# Patient Record
Sex: MALE | Race: WHITE
[De-identification: names, ages, dates, MRNs, and addresses within clinical notes are randomized per-mention and may not be internally consistent; named-entity substitution may affect disease eponyms.]

---

## 2017-11-08 NOTE — ED PHYSICIAN CHART
ED Chief Complaint/HPI





- Patient Information


Date Seen:: 11/08/17


Time Seen:: 20:30


Chief Complaint:: Bilateral lower extremities ulcers


History of Present Illness:: 


73 yo male was brought by caretaker from [place] to ER for evaluation of 

bilateral lower extremities ulcers for more than a few months. These ulcers are 

healing with eschar except re-opening of an ulcer in the RLE. The patient was 

oriented to his name and place, not time.


Allergies:: 


 Allergies











Allergy/AdvReac Type Severity Reaction Status Date / Time


 


niacin Allergy   Verified 11/07/17 18:59











Vitals:: 


 Vital Signs - 8 hr











  11/07/17





  18:59


 


Temp 97.2 F


 


HR 54


 


RR 16


 


/76


 


O2 Sat % 96














ED Review of Systems





- Review of Systems


General/Constitutional: No fever, No chills


Skin: Skin lesions


Head: No headache


Eyes: No loss of vision


ENT: No nasal drainage


Neck: No neck pain, No swelling


Cardio Vascular: No chest pain


Pulmonary: No SOB


GI: No nausea, No vomiting


Musculoskeletal: No bone or joint pain


Psychiatric: Prior psych history, Anxiety


Neurological: No syncope





ED Past Medical History





- Past Medical History


Past Medical History: HTN, DVT/PE, PUD/GERD, Other (BPH)


Psychiatricy History: Schizophrenia, Dementia, Other (Anxiety)





Family Medical History





- Family Member


  ** Mother


History Unknown: Yes





ED Physical Exam





- Physical Examination


General/Constitutional: Awake, Alert


Head: Atraumatic


Eyes: PERRL, EOMI


Other Skin comments:: 


Multiple healed ulcers with eschar on BLE with a open ulcer on RLE


Neck: No JVD


Respiratory: Clear to Auscultation, No Wheeze/Rhonchi/Rales


Cardio Vascular: RRR, No murmur, gallop, rubs, NL S1 S2


Extremities: No edema


Other Extremities comments:: 


mild erythema of BLE surrounding the ulcers





ED Labs/Radiology/EKG Results





- Lab Results


Results: 


 Laboratory Tests











  11/07/17 11/07/17 11/07/17





  20:22 20:22 20:22


 


WBC  9.2  


 


RBC  4.10  


 


Hgb  13.0  


 


Hct  38.7 L  


 


MCV  94.5  


 


MCH  31.8 H  


 


MCHC Differential  33.7  


 


RDW  13.7  


 


Plt Count  201  


 


MPV  7.2  


 


Neutrophils %  67.2  


 


Lymphocytes %  23.1  


 


Monocytes %  6.5  


 


Eosinophils %  2.6  


 


Basophils %  0.6  


 


Sodium   133 L 


 


Potassium   4.2 


 


Chloride   100 


 


Carbon Dioxide   29.0 


 


Anion Gap   8.2 


 


BUN   24 


 


Creatinine   1.3 


 


Est GFR ( Amer)   TNP 


 


Est GFR (Non-Af Amer)   TNP 


 


BUN/Creatinine Ratio   18.5 


 


Glucose   88 


 


Hemoglobin A1c %    5.8


 


Calcium   9.7 


 


Total Bilirubin   0.8 


 


AST   23 


 


ALT   22 


 


Alkaline Phosphatase   52 


 


Total Protein   7.3 


 


Albumin   3.7 L 


 


Globulin   3.6 


 


Albumin/Globulin Ratio   1.0 














ED Assessment





- Assessment


General Assessment: 


BLE mild cellulitis, BLE ulcers healed, RLE open ulcer, hyponatremia


Critical Care Time: 30 min


Excludes all billable procedures: Yes


This condition life threatening/high prob of deterioration: No


Assessment/Comments:: 


CBC, CMP, HbA1c, CXR, EKG


  Admit to med surg for further evaluation and management





ED Septic Shock





- .


Is Septic Shock (SBP<90, OR Lactate>4 mmol\L) present?: No





- <6hrs of presentation:


Vital Signs: 


 Vital Signs - 8 hr











  11/07/17





  18:59


 


Temp 97.2 F


 


HR 54


 


RR 16


 


/76


 


O2 Sat % 96














ED Reassessment (Disposition)





- Reassessment


Reassessment Condition:: Unchanged





- Patient Disposition


Discharge/Transfer:: Acute Care w/in this hosp


Admitting Medical Physician:: Carmina Ferrell





ED Discharge Plan





- Patient Disposition


Admit/Discharge/Transfer: Acute Care w/in this hosp

## 2017-11-09 NOTE — HISTORY & PHYSICAL
ADMIT DATE:     11/08/2017



CHIEF COMPLAINT:  Bilateral lower extremity redness and RLE open ulcer.



HISTORY OF PRESENT ILLNESS:  The patient is a 72-year-old gentleman with history

of hypertension, BPH, acid reflux disease, possible Parkinson's, who was

transferred to the ER from his board and care apparently secondary to the above.

In the ER, pertinent findings included sodium of 133, BUN of 24 and physically,

bilateral lower extremity redness.  The patient is a poor historian, is not able

to provide any significant history.  He has been admitted to the

medical/surgical floor for IV hydration, wound care and IV abxs. Per caretaker 
at

assisted facility, pt was recently admitted there with previous hx of ble open 
wounds, but

at the time of admission they were well healed, however, yesterday when they 
evaled him,

the RLE had re-opened again.



PAST MEDICAL HISTORY:  Per records as noted above.



PAST SURGICAL HISTORY:  Unknown.



FAMILY HISTORY:  Likely noncontributory to his admission.



SOCIAL HISTORY:  Unknown, but he currently lives at a [facility] and [facility].



ALLERGIES:  Allergic to niacin.



OUTPATIENT MEDICATIONS:  Tylenol p.r.n., vitamin D 1000 units q. daily,

dexlansoprazole 60 mg a.c. and at bedtime, Depakote ER t.i.d., docusate sodium

100 mg q. day, escitalopram 10 mg q. day, Proscar at bedtime, milk of magnesia

30 mL p.r.n. for constipation, metoprolol 25 b.i.d., multivitamins q. day,

Zyprexa 1 mg at bedtime, omeprazole 20 mg q.a.c., oxybutynin 2 tabs q. daily,

ramipril 10 mg b.i.d., Xarelto once a day, solifenacin q. day, Flomax 0.4 at

bedtime.



REVIEW OF SYSTEMS:  A good review of systems was not able to be done given the

patient's condition.



PHYSICAL EXAMINATION:

VITAL SIGNS:  Temperature 96, pulse 68, /82, respirations 18, and satting

94% on room air.

GENERAL:  He is a well-developed and well-nourished gentleman, awake, he seems

to follow commands and attempts to answer questions, but currently is just

grumbling.  He appears to be in no distress.

HEAD AND NECK:  Normocephalic, atraumatic.  Pupils are reactive to light. 

Extraocular movements are intact.  Oropharynx is moist and clear.

CARDIAC:  Regular rate and rhythm with no murmurs.

LUNGS:  Decreased at the bases, but clear to auscultation bilaterally.

ABDOMEN:  Soft, supple, nontender, nondistended, normoactive bowel sounds.

EXTREMITIES:  In lower extremities, there are dermatitic changes on both legs up

to the mid shins.  There is mild erythema noted on both legs, but no open wounds

and no ulcers.  There is 1-2+ pedal pulses. RLE with very small round open wound

on the lateral aspect, which appears clean with no surrounding erythema. On the 

LLE, there is a small round ulcer at the same position as the RLE with eschar 
formation.

NEUROLOGIC:  Difficult to assess given the patient's condition.  He does have

tremors noted, both upper extremity tremor, appears to be nonfocal otherwise.



LABORATORY DATA:  On admission; his CBC was essentially within normal limits. 

Sodium 133, BUN 24, creatinine 1.3, and albumin 3.7.



DIAGNOSTICS:  None current.



ASSESSMENT:

1.  Mild bilateral lower extremity cellulitis.

2.  RLE open ulcer-f/u wound c/s, daily WC, silvadene cream

3.  LLE ulcer-appears to be healing with eschar formation.

4.  Hyponatremia-IVF, monitor.

5.  Mild renal insufficiency/dehydration-as above.

6.  Essential hypertension.

7.  Acid reflux disease.

8.  DVT prophylaxis-on Xarelto.

9.  Parkinson's dz?



PLAN:  The patient has been admitted to the medical floor for further management

and care.  He has been placed on IV abxs, IVF at 75 mL per hour. Daily WC has 
been

ordered, and a wound C/S has been collected. He will be kept on his other 
medications

as scheduled, and we will monitor his labs.  





DD: 11/09/2017 09:12

DT: 11/09/2017 10:04

JOB# [id number]  [id number]

[id number]

## 2018-11-21 ENCOUNTER — HOSPITAL ENCOUNTER (INPATIENT)
Dept: HOSPITAL 36 - ER | Age: 73
LOS: 7 days | Discharge: NURSING, REHAB OR LONG-TERM CARE | DRG: 391 | End: 2018-11-28
Attending: INTERNAL MEDICINE | Admitting: INTERNAL MEDICINE
Payer: MEDICARE

## 2018-11-21 VITALS — DIASTOLIC BLOOD PRESSURE: 76 MMHG | SYSTOLIC BLOOD PRESSURE: 139 MMHG

## 2018-11-21 DIAGNOSIS — F03.90: ICD-10-CM

## 2018-11-21 DIAGNOSIS — F32.9: ICD-10-CM

## 2018-11-21 DIAGNOSIS — Z86.718: ICD-10-CM

## 2018-11-21 DIAGNOSIS — B96.20: ICD-10-CM

## 2018-11-21 DIAGNOSIS — B95.8: ICD-10-CM

## 2018-11-21 DIAGNOSIS — N18.9: ICD-10-CM

## 2018-11-21 DIAGNOSIS — F79: ICD-10-CM

## 2018-11-21 DIAGNOSIS — R29.2: ICD-10-CM

## 2018-11-21 DIAGNOSIS — E78.5: ICD-10-CM

## 2018-11-21 DIAGNOSIS — N40.0: ICD-10-CM

## 2018-11-21 DIAGNOSIS — D72.829: ICD-10-CM

## 2018-11-21 DIAGNOSIS — F20.0: ICD-10-CM

## 2018-11-21 DIAGNOSIS — N31.9: ICD-10-CM

## 2018-11-21 DIAGNOSIS — E86.0: ICD-10-CM

## 2018-11-21 DIAGNOSIS — I12.9: ICD-10-CM

## 2018-11-21 DIAGNOSIS — G40.909: ICD-10-CM

## 2018-11-21 DIAGNOSIS — F29: ICD-10-CM

## 2018-11-21 DIAGNOSIS — N17.0: ICD-10-CM

## 2018-11-21 DIAGNOSIS — K52.9: Primary | ICD-10-CM

## 2018-11-21 DIAGNOSIS — I82.409: ICD-10-CM

## 2018-11-21 DIAGNOSIS — Z88.8: ICD-10-CM

## 2018-11-21 DIAGNOSIS — N13.30: ICD-10-CM

## 2018-11-21 DIAGNOSIS — N35.919: ICD-10-CM

## 2018-11-21 DIAGNOSIS — N39.0: ICD-10-CM

## 2018-11-21 DIAGNOSIS — L97.929: ICD-10-CM

## 2018-11-21 LAB
ALBUMIN SERPL-MCNC: 3.5 GM/DL (ref 4.2–5.5)
ALBUMIN/GLOB SERPL: 1 {RATIO} (ref 1–1.8)
ALP SERPL-CCNC: 25 U/L (ref 34–104)
ALT SERPL-CCNC: 14 U/L (ref 7–52)
AMYLASE SERPL-CCNC: 29 U/L (ref 29–103)
ANION GAP SERPL CALC-SCNC: 13.8 MMOL/L (ref 7–16)
APPEARANCE UR: (no result)
AST SERPL-CCNC: 25 U/L (ref 13–39)
BACTERIA #/AREA URNS HPF: (no result) /HPF
BASOPHILS # BLD AUTO: 0 TH/CUMM (ref 0–0.2)
BASOPHILS NFR BLD AUTO: 0.2 % (ref 0–2)
BILIRUB SERPL-MCNC: 0.3 MG/DL (ref 0.3–1)
BILIRUB UR-MCNC: NEGATIVE MG/DL
BUN SERPL-MCNC: 68 MG/DL (ref 7–25)
CALCIUM SERPL-MCNC: 9.5 MG/DL (ref 8.6–10.3)
CHLORIDE SERPL-SCNC: 106 MEQ/L (ref 98–107)
CHOLEST SERPL-MCNC: 160 MG/DL (ref ?–200)
CK SERPL-CCNC: 27 U/L (ref 30–223)
CO2 SERPL-SCNC: 25.1 MEQ/L (ref 21–31)
COLOR UR: YELLOW
CREAT SERPL-MCNC: 2.9 MG/DL (ref 0.7–1.3)
EOSINOPHIL # BLD AUTO: 0.2 TH/CMM (ref 0.1–0.4)
EOSINOPHIL NFR BLD AUTO: 1.7 % (ref 0–5)
EPI CELLS URNS QL MICRO: (no result) /LPF
ERYTHROCYTE [DISTWIDTH] IN BLOOD BY AUTOMATED COUNT: 12.4 % (ref 11.5–20)
GLOBULIN SER-MCNC: 3.7 GM/DL
GLUCOSE SERPL-MCNC: 134 MG/DL (ref 70–105)
GLUCOSE UR STRIP-MCNC: NEGATIVE MG/DL
HCT VFR BLD CALC: 35.6 % (ref 41–60)
HDLC SERPL-MCNC: 36 MG/DL (ref 23–92)
HGB BLD-MCNC: 12.1 GM/DL (ref 12–16)
INR PPP: 1.3 (ref 0.5–1.4)
KETONES UR STRIP-MCNC: NEGATIVE MG/DL
LEUKOCYTE ESTERASE UR-ACNC: (no result)
LIPASE SERPL-CCNC: 63 U/L (ref 11–82)
LYMPHOCYTE AB SER FC-ACNC: 1.7 TH/CMM (ref 1.5–3)
LYMPHOCYTES NFR BLD AUTO: 14.7 % (ref 20–50)
MCH RBC QN AUTO: 33.3 PG (ref 27–31)
MCHC RBC AUTO-ENTMCNC: 34 PG (ref 28–36)
MCV RBC AUTO: 98 FL (ref 80–99)
MICRO URNS: YES
MONOCYTES # BLD AUTO: 0.9 TH/CMM (ref 0.3–1)
MONOCYTES NFR BLD AUTO: 7.6 % (ref 2–10)
NEUTROPHILS # BLD: 8.6 TH/CMM (ref 1.8–8)
NEUTROPHILS NFR BLD AUTO: 75.8 % (ref 40–80)
NITRITE UR QL STRIP: POSITIVE
PH UR STRIP: 6.5 [PH] (ref 4.6–8)
PLATELET # BLD: 273 TH/CMM (ref 150–400)
PMV BLD AUTO: 7.8 FL
POTASSIUM SERPL-SCNC: 4.9 MEQ/L (ref 3.5–5.1)
PROT UR STRIP-MCNC: 100 MG/DL
PROTHROMBIN TIME: 13.3 SECONDS (ref 9.5–11.5)
RBC # BLD AUTO: 3.63 MIL/CMM (ref 3.8–5.8)
RBC # UR STRIP: (no result) /UL
RBC #/AREA URNS HPF: (no result) /HPF (ref 0–5)
SODIUM SERPL-SCNC: 140 MEQ/L (ref 136–145)
SP GR UR STRIP: 1.02 (ref 1–1.03)
TRIGL SERPL-MCNC: 201 MG/DL (ref ?–150)
URINALYSIS COMPLETE PNL UR: (no result)
UROBILINOGEN UR STRIP-ACNC: 0.2 E.U./DL (ref 0.2–1)
WBC # BLD AUTO: 11.4 TH/CMM (ref 4.8–10.8)
WBC #/AREA URNS HPF: (no result) /HPF (ref 0–5)

## 2018-11-21 PROCEDURE — X6258: HCPCS

## 2018-11-21 PROCEDURE — Z7610: HCPCS

## 2018-11-21 PROCEDURE — X5958: HCPCS

## 2018-11-21 PROCEDURE — X3401: HCPCS

## 2018-11-21 NOTE — ED PHYSICIAN CHART
ED Chief Complaint/HPI





- Patient Information


Date Seen:: 11/21/18


Time Seen:: 13:25


Chief Complaint:: Vomiting


History of Present Illness:: 





onset x 2 days of N/V/D x 6; no report of trauma, LOC, ALOC, AMS, H/As, S/T, 

neck pain, cough, C/P, SOB, Abd. Pain, A/C, fever, chills, or urinary s/s


Allergies:: 


 Allergies











Allergy/AdvReac Type Severity Reaction Status Date / Time


 


niacin Allergy   Verified 11/07/17 18:59











Vitals:: 


 Vital Signs - 8 hr











  11/21/18





  13:25


 


Temp 96.6 F


 


HR 63


 


RR 18


 


/60











Historian:: Patient, EMS


Review:: Nurse's Note Reviewed, Old Chart Reviewed, EMS run form Reviewed





ED Review of Systems





- Review of Systems


General/Constitutional: No fever, No chills, No weight loss, No weakness, No 

diaphoresis, No edema, No loss of appetite


Skin: No skin lesions, No rash, No bruising


Head: No headache, No light-headedness


Eyes: No loss of vision, No pain, No diplopia


ENT: No earache, No nasal drainage, No sore throat, No tinnitus


Neck: No neck pain, No swelling, No thyromegaly, No stiffness, No mass noted


Cardio Vascular: No chest pain, No palpitations, No PND, No orthopnea, No edema


Pulmonary: No SOB, No cough, No sputum, No wheezing


GI: Nausea, Vomiting, Diarrhea, Pain, No melena, No hematochezia, No 

constipation, No hematemesis


G/U: No dysuria, No frequency, No hematuria, No nacturia


Musculoskeletal: No bone or joint pain, No back pain, No muscle pain


Endocrine: No polyuria, No polydipsia


Psychiatric: Prior psych history, Depression, Anxiety, No suicidal ideation, No 

homicidal ideation, Auditory hallucination, No visual hallucination


Hematopoietic: No bruising, No lymphadenopathy


Allergic/Immuno: No urticaria, No angioedema


Neurological: No syncope, No focal symptoms, No weakness, No paresthesia, No 

headache, No seizure, No dizziness, Confusion, No vertigo





ED Past Medical History





- Past Medical History


Obtainable: Yes


Past Medical History: HTN, Dementia


Family History: HTN


Social History: Non Smoker, No Alcohol, No Drug Use, Single, Care Facility


Surgical History: None


Psychiatricy History: Depression, Schizophrenia, Dementia


Medication: Reviewed





Family Medical History





- Family Member


  ** Mother


History Unknown: Yes





ED Physical Exam





- Physical Examination


General/Constitutional: Awake, Well-developed, well-nourished, Alert, No 

distress, GCS 15, Non-toxic appearing, Ambulatory


Head: Atraumatic


Eyes: Lids, conjuctiva normal, PERRL, EOMI


Skin: Nl inspection, No rash, No skin lesions, No ecchymosis, Well hydrated, No 

lymphadenopathy


ENMT: External ears, nose nl, TM canals nl, Nasal exam nl, Lips, teeth, gums nl

, Oropharynx nl, Tonsils nl


Neck: Nontender, Full ROM w/o pain, No JVD, No nuchal rigidity, No bruit, No 

mass, No stridor


Respiratory: Nl effort/Exclusion, Clear to Auscultation, No Wheeze/Rhonchi/Rales


Cardio Vascular: RRR, No murmur, gallop, rubs, NL S1 S2, Carotid/Femoral/Distal 

pulses equal bilaterally


GI: No tenderness/rebounding/guarding, No organomegaly, No hernia, Normal BS's, 

Nondistended, No mass/bruits, No McBurney tenderness, Rectum exam nl


Other GI comments:: 





no pulsatile masses


: No CVA tenderness


Extremities: No tenderness or effusion, Full ROM, normal strength in all 

extremities, No edema, Normal digits & nails


Neuro/Psych: Alert/oriented, DTR's symmetric, Normal sensory exam, Normal motor 

strength, Judgement/insight normal, Mood normal, Normal gait, No focal deficits


Misc: Normal back, No paraspinal tenderness





ED Labs/Radiology/EKG Results





- Lab Results


Comments:: 





Reviewed





- Radiology Results


Comments:: 





NAD





- EKG Interpretations


EKG Time:: 14:02


Rate & Rhythm: 62; NSR


Comments:: 





RBBB; non-specific st-t changes





ED Septic Shock





- .


Is Septic Shock (SBP<90, OR Lactate>4 mmol\L) present?: No





- <6hrs of presentation:


Vital Signs: 


 Vital Signs - 8 hr











  11/21/18





  13:25


 


Temp 96.6 F


 


HR 63


 


RR 18


 


/60














ED Reassessment (Disposition)





- Reassessment


Reassessment Condition:: Improved





- Diagnosis


Diagnosis:: 





Dx: AGE; N/V/D; Gastroenteritis; Leukocytosis; Dehydration; Renal Insuffiency; 

Pre-Renal Azotemia; Hematuria; UTI





- Aftercare/Follow up Instructions


Aftercare/Follow-Up Instructions:: Counseled pt regarding lab results/diagnosis 

& need follow up, Counseled pt & family regarding lab results/diagnosis & need 

follow up





- Patient Disposition


Discharge/Transfer:: Acute Care w/in this hosp


Accepting Physician:: Dr. Ferrell


Time Called:: 1515


Time Responded:: 15:15


Admitted to:: Telemetry


Spoke to:: Dr. Ferrell


Admitting Medical Physician:: Dr. Ferrell


Condition at Disposition:: Stable, Improved

## 2018-11-22 LAB
ALBUMIN SERPL-MCNC: 3.5 GM/DL (ref 4.2–5.5)
ALBUMIN/GLOB SERPL: 1 {RATIO} (ref 1–1.8)
ALP SERPL-CCNC: 31 U/L (ref 34–104)
ALT SERPL-CCNC: 14 U/L (ref 7–52)
ANION GAP SERPL CALC-SCNC: 10.3 MMOL/L (ref 7–16)
AST SERPL-CCNC: 28 U/L (ref 13–39)
BASOPHILS # BLD AUTO: 0 TH/CUMM (ref 0–0.2)
BASOPHILS NFR BLD AUTO: 0.5 % (ref 0–2)
BILIRUB SERPL-MCNC: 0.4 MG/DL (ref 0.3–1)
BUN SERPL-MCNC: 60 MG/DL (ref 7–25)
CALCIUM SERPL-MCNC: 9.7 MG/DL (ref 8.6–10.3)
CHLORIDE SERPL-SCNC: 109 MEQ/L (ref 98–107)
CO2 SERPL-SCNC: 27.6 MEQ/L (ref 21–31)
CREAT SERPL-MCNC: 2.5 MG/DL (ref 0.7–1.3)
EOSINOPHIL # BLD AUTO: 0.2 TH/CMM (ref 0.1–0.4)
EOSINOPHIL # BLD MANUAL: (no result) 10*3/UL
EOSINOPHIL BLD QL WRIGHT STN: (no result)
EOSINOPHIL NFR BLD AUTO: 2.7 % (ref 0–5)
ERYTHROCYTE [DISTWIDTH] IN BLOOD BY AUTOMATED COUNT: 12.6 % (ref 11.5–20)
GLOBULIN SER-MCNC: 3.6 GM/DL
GLUCOSE SERPL-MCNC: 84 MG/DL (ref 70–105)
HCT VFR BLD CALC: 35.4 % (ref 41–60)
HGB BLD-MCNC: 12.1 GM/DL (ref 12–16)
LYMPHOCYTE AB SER FC-ACNC: 1.4 TH/CMM (ref 1.5–3)
LYMPHOCYTES NFR BLD AUTO: 15.8 % (ref 20–50)
MAGNESIUM SERPL-MCNC: 2.2 MG/DL (ref 1.9–2.7)
MCH RBC QN AUTO: 33.6 PG (ref 27–31)
MCHC RBC AUTO-ENTMCNC: 34.2 PG (ref 28–36)
MCV RBC AUTO: 98 FL (ref 80–99)
MONOCYTES # BLD AUTO: 0.7 TH/CMM (ref 0.3–1)
MONOCYTES NFR BLD AUTO: 8.4 % (ref 2–10)
NEUTROPHILS # BLD: 6.4 TH/CMM (ref 1.8–8)
NEUTROPHILS NFR BLD AUTO: 72.6 % (ref 40–80)
PLATELET # BLD: 267 TH/CMM (ref 150–400)
PMV BLD AUTO: 7.4 FL
POTASSIUM SERPL-SCNC: 4.9 MEQ/L (ref 3.5–5.1)
RBC # BLD AUTO: 3.61 MIL/CMM (ref 3.8–5.8)
SODIUM SERPL-SCNC: 142 MEQ/L (ref 136–145)
WBC # BLD AUTO: 8.7 TH/CMM (ref 4.8–10.8)

## 2018-11-22 RX ADMIN — OYSTER SHELL CALCIUM WITH VITAMIN D SCH TAB: 500; 200 TABLET, FILM COATED ORAL at 16:20

## 2018-11-23 LAB
ANION GAP SERPL CALC-SCNC: 13.5 MMOL/L (ref 7–16)
BASOPHILS # BLD AUTO: 0 TH/CUMM (ref 0–0.2)
BASOPHILS NFR BLD AUTO: 0.3 % (ref 0–2)
BUN SERPL-MCNC: 39 MG/DL (ref 7–25)
CALCIUM SERPL-MCNC: 8.9 MG/DL (ref 8.6–10.3)
CHLORIDE SERPL-SCNC: 107 MEQ/L (ref 98–107)
CO2 SERPL-SCNC: 22.2 MEQ/L (ref 21–31)
CREAT SERPL-MCNC: 1.9 MG/DL (ref 0.7–1.3)
EOSINOPHIL # BLD AUTO: 0.3 TH/CMM (ref 0.1–0.4)
EOSINOPHIL NFR BLD AUTO: 2 % (ref 0–5)
ERYTHROCYTE [DISTWIDTH] IN BLOOD BY AUTOMATED COUNT: 12.1 % (ref 11.5–20)
GLUCOSE SERPL-MCNC: 85 MG/DL (ref 70–105)
HCT VFR BLD CALC: 31.7 % (ref 41–60)
HGB BLD-MCNC: 11.1 GM/DL (ref 12–16)
LYMPHOCYTE AB SER FC-ACNC: 1.8 TH/CMM (ref 1.5–3)
LYMPHOCYTES NFR BLD AUTO: 14.4 % (ref 20–50)
MAGNESIUM SERPL-MCNC: 1.6 MG/DL (ref 1.9–2.7)
MCH RBC QN AUTO: 33.5 PG (ref 27–31)
MCHC RBC AUTO-ENTMCNC: 34.8 PG (ref 28–36)
MCV RBC AUTO: 96.1 FL (ref 80–99)
MONOCYTES # BLD AUTO: 1 TH/CMM (ref 0.3–1)
MONOCYTES NFR BLD AUTO: 8.2 % (ref 2–10)
NEUTROPHILS # BLD: 9.5 TH/CMM (ref 1.8–8)
NEUTROPHILS NFR BLD AUTO: 75.1 % (ref 40–80)
PHOSPHATE SERPL-MCNC: 2.6 MG/DL (ref 2.5–5)
PLATELET # BLD: 214 TH/CMM (ref 150–400)
PMV BLD AUTO: 6.9 FL
POTASSIUM SERPL-SCNC: 4.7 MEQ/L (ref 3.5–5.1)
RBC # BLD AUTO: 3.3 MIL/CMM (ref 3.8–5.8)
SODIUM SERPL-SCNC: 138 MEQ/L (ref 136–145)
WBC # BLD AUTO: 12.6 TH/CMM (ref 4.8–10.8)

## 2018-11-23 RX ADMIN — CIPROFLOXACIN SCH MLS/HR: 2 INJECTION, SOLUTION INTRAVENOUS at 10:21

## 2018-11-23 RX ADMIN — PANTOPRAZOLE SODIUM SCH MG: 40 TABLET, DELAYED RELEASE ORAL at 10:17

## 2018-11-23 RX ADMIN — OYSTER SHELL CALCIUM WITH VITAMIN D SCH TAB: 500; 200 TABLET, FILM COATED ORAL at 10:17

## 2018-11-23 RX ADMIN — OYSTER SHELL CALCIUM WITH VITAMIN D SCH TAB: 500; 200 TABLET, FILM COATED ORAL at 17:39

## 2018-11-23 NOTE — GENERAL PROGRESS NOTE
Subjective





- Review of Systems


Service Date: 18


Subjective: 





alert, comfortable; no further N/V, diarrhea





Objective





- Results


Result Diagrams: 


 18 04:25





 18 04:25


Recent Labs: 


 Laboratory Last Values











WBC  12.6 Th/cmm (4.8-10.8)  H  18  04:25    


 


RBC  3.30 Mil/cmm (3.80-5.80)  L  18  04:25    


 


Hgb  11.1 gm/dL (12-16)  L  18  04:25    


 


Hct  31.7 % (41.0-60)  L  18  04:25    


 


MCV  96.1 fl (80-99)   18  04:25    


 


MCH  33.5 pg (27.0-31.0)  H  18  04:25    


 


MCHC Differential  34.8 pg (28.0-36.0)   18  04:25    


 


RDW  12.1 % (11.5-20.0)   18  04:25    


 


Plt Count  214 Th/cmm (150-400)   18  04:25    


 


MPV  6.9 fl  18  04:25    


 


Neutrophils %  75.1 % (40.0-80.0)   18  04:25    


 


Lymphocytes %  14.4 % (20.0-50.0)  L  18  04:25    


 


Monocytes %  8.2 % (2.0-10.0)   18  04:25    


 


Eosinophils %  2.0 % (0.0-5.0)   18  04:25    


 


Basophils %  0.3 % (0.0-2.0)   18  04:25    


 


Eos Smear Source  URINE   18  11:55    


 


Eos Smear Total Cells  NONE SEEN  (NONE SEEN)   18  11:55    


 


PT  13.3 SECONDS (9.5-11.5)  H  18  13:50    


 


INR  1.30  (0.5-1.4)   18  13:50    


 


Sodium  138 mEq/L (136-145)   18  04:25    


 


Potassium  4.7 mEq/L (3.5-5.1)   18  04:25    


 


Chloride  107 mEq/L ()   18  04:25    


 


Carbon Dioxide  22.2 mEq/L (21.0-31.0)   18  04:25    


 


Anion Gap  13.5  (7.0-16.0)   18  04:25    


 


BUN  39 mg/dL (7-25)  H  18  04:25    


 


Creatinine  1.9 mg/dL (0.7-1.3)  H  18  04:25    


 


Est GFR ( Amer)  TNP   18  04:25    


 


Est GFR (Non-Af Amer)  TNP   18  04:25    


 


BUN/Creatinine Ratio  20.5   18  04:25    


 


Glucose  85 mg/dL ()   18  04:25    


 


Calcium  8.9 mg/dL (8.6-10.3)   18  04:25    


 


Phosphorus  2.6 mg/dL (2.5-5.0)   18  04:25    


 


Magnesium  1.6 mg/dL (1.9-2.7)  L  18  04:25    


 


Total Bilirubin  0.4 mg/dL (0.3-1.0)   18  06:05    


 


AST  28 U/L (13-39)   18  06:05    


 


ALT  14 U/L (7-52)   18  06:05    


 


Alkaline Phosphatase  31 U/L ()  L  18  06:05    


 


Creatine Kinase  27 U/L ()  L  18  13:50    


 


Troponin I  0.02 ng/mL (0.01-0.05)   18  13:50    


 


B-Natriuretic Peptide  51.8 pg/mL (5.0-100.0)   18  13:50    


 


Total Protein  7.1 gm/dL (6.0-8.3)   18  06:05    


 


Albumin  3.5 gm/dL (4.2-5.5)  L  18  06:05    


 


Globulin  3.6 gm/dL  18  06:05    


 


Albumin/Globulin Ratio  1.0  (1.0-1.8)   18  06:05    


 


Triglycerides  201 mg/dL (<150)  H  18  13:50    


 


Cholesterol  160 mg/dL (<200)   18  13:50    


 


LDL Cholesterol Direct  110 mg/dL ()   18  13:50    


 


HDL Cholesterol  36 mg/dL (23-92)   18  13:50    


 


Amylase  29 U/L ()   18  13:50    


 


Lipase  63 U/L (11-82)   18  13:50    


 


TSH  5.80 uIU/ml (0.34-5.60)  H  18  06:05    


 


Urine Source  CLEAN C   18  15:15    


 


Urine Color  YELLOW   18  15:15    


 


Urine Clarity  CLOUDY  (CLEAR)   18  15:15    


 


Urine pH  6.5  (4.6 - 8.0)   18  15:15    


 


Ur Specific Gravity  1.020  (1.005-1.030)   18  15:15    


 


Urine Protein  100 mg/dL (NEGATIVE)  H  18  15:15    


 


Urine Glucose (UA)  NEGATIVE mg/dL (NEGATIVE)   18  15:15    


 


Urine Ketones  NEGATIVE mg/dL (NEGATIVE)   18  15:15    


 


Urine Blood  LARGE  (NEGATIVE)  H  18  15:15    


 


Urine Nitrate  POSITIVE  (NEGATIVE)  H  18  15:15    


 


Urine Bilirubin  NEGATIVE  (NEGATIVE)   18  15:15    


 


Urine Urobilinogen  0.2 E.U./dL (0.2 - 1.0)   18  15:15    


 


Ur Leukocyte Esterase  LARGE  (NEGATIVE)  H  18  15:15    


 


Urine RBC  5-10 /hpf (0-5)  H  18  15:15    


 


Urine WBC   /hpf (0-5)  H  18  15:15    


 


Ur Epithelial Cells  FEW /lpf (FEW)   18  15:15    


 


Urine Bacteria  MANY /hpf (NONE SEEN)  H  18  15:15    


 


Ur Random Sodium  119 mmol/L  18  11:55    


 


Urine Creatinine  74.0 mg/dl (39.0-259.0)   18  11:55    


 


Microalb/Creat Ratio  195.2 mg/g creat (0.0-30.0)  H  18  11:55    














- Physical Exam


Vitals and I&O: 


 Vital Signs











Temp  98.1 F   18 16:45


 


Pulse  76   18 16:45


 


Resp  18   18 16:45


 


BP  118/70   18 16:45


 


Pulse Ox  98   18 16:45








 Intake & Output











 18





 18:59 06:59 18:59


 


Intake Total 550 1050 


 


Balance 550 1050 


 


Weight (lbs) 63.957 kg 63.957 kg 


 


Intake:   


 


  Intake, IV Amount  1000 


 


    Sodium Chloride 0.9% 1,  1000 





    000 ml @ 100 mls/hr IV .   





    Q10H [facility] Rx#:[id number]   


 


  Oral 550 50 


 


Other:   


 


  # Voids 3 2 


 


  # Bowel Movements 1 0 


 


  Stool Characteristics   Soft





   Formed


 


  Weight Source Bedscale Bedscale 











Active Medications: 


Current Medications





Acetaminophen (Tylenol)  650 mg PO Q6H PRN


   PRN Reason: fever/pain


   Stop: 19 13:42


Calcium/Vitamin D (Oscal W/Vitamin D)  1 tab PO BID [facility]


   Stop: 19 16:59


   Last Admin: 18 10:17 Dose:  1 tab


Cholecalciferol (Vitamin D3)  1,000 iu PO DAILY [facility]


   Stop: 19 13:44


   Last Admin: 18 10:17 Dose:  1,000 iu


Divalproex Sodium (Depakote Er)  500 mg PO TID [facility]; Protocol


   Stop: 19 13:59


   Last Admin: 18 14:21 Dose:  500 mg


Docusate Sodium (Colace)  100 mg PO DAILY [facility]


   Stop: 19 13:44


   Last Admin: 18 10:17 Dose:  100 mg


Escitalopram Oxalate (Lexapro)  10 mg PO DAILY [facility]; Protocol


   Stop: 19 13:44


   Last Admin: 18 10:16 Dose:  10 mg


Finasteride (Proscar)  5 mg PO HS [facility]; Protocol


   Stop: 19 20:59


   Last Admin: 18 21:05 Dose:  5 mg


Sodium Chloride (Nacl 0.9%)  1,000 mls @ 100 mls/hr IV .Q10H [facility]


   Stop: 19 17:44


   Last Admin: 18 03:31 Dose:  100 mls/hr


Ceftriaxone Sodium 1 gm/ (Sodium Chloride)  50 mls @ 100 mls/hr IV Q24HR [facility]


   Stop: 19 16:59


   Last Admin: 18 16:19 Dose:  100 mls/hr


Ciprofloxacin (Cipro 200mg Premix Pb)  200 mg in 100 mls @ 100 mls/hr IV Q24HR 

[facility]


   Stop: 19 09:44


   Last Admin: 18 10:21 Dose:  100 mls/hr


Magnesium Hydroxide (Milk Of Magnesia)  30 ml PO DAILY PRN


   PRN Reason: Constipation


   Stop: 19 13:45


Metoprolol Tartrate (Lopressor)  25 mg PO BID [facility]


   Stop: 19 16:59


   Last Admin: 18 10:16 Dose:  25 mg


Olanzapine (Zyprexa)  5 mg PO [facility]; Protocol


   Stop: 19 20:59


   Last Admin: 18 21:05 Dose:  5 mg


Oxybutynin Chloride (Ditropan)  10 mg PO DAILY [facility]


   Stop: 19 08:59


   Last Admin: 18 10:16 Dose:  10 mg


Pantoprazole Sodium (Protonix)  40 mg PO DAILY [facility]


   Stop: 19 08:59


   Last Admin: 18 10:17 Dose:  40 mg


Rivaroxaban (Xarelto)  10 mg PO DAILY [facility]


   Stop: 19 08:59


   Last Admin: 18 10:17 Dose:  10 mg


Tamsulosin HCl (Flomax)  0.4 mg PO [facility]


   Stop: 19 20:59


   Last Admin: 18 21:05 Dose:  0.4 mg








General: Alert, No acute distress


HEENT: Atraumatic, Mucous membr. moist/pink


Neck: Supple, +2 carotid pulse wo bruit


Cardiovascular: Regular rate, Normal S1, Normal S2


Lungs: Clear to auscultation


Abdomen: Bowel sounds, Soft


Extremities: no Edema


Neurological: Sensation intact


Skin: no Significant lesion


Psych/Mental Status: Mood NL





- Procedures


Procedures: 


 Procedures











Procedure Code Date


 


C & S-INTEGUMENT 91.63 95


 


CULTURE OTHR SPECIMN AEROBIC 30676 95


 


DX ULTRASOUND-VASCULAR 88.77 95


 


ELECTROCARDIOGRAPH MONIT 89.54 95


 


INJECT ANTIBIOTIC 99.21 10/09/95


 


INJECT ANTICOAGULANT 99.19 95


 


MICROBE SUSCEPTIBLE DISK 02576 95


 


OTHER C.A.T. SCAN 88.38 95


 


URINARY SYSTEM X-RAY NEC 87.79 95


 


WHIRLPOOL THERAPY 61144 95


 


WHIRLPOOL TREATMENT 93.32 95














Assessment/Plan





- Assessment


Assessment: 





DEVIN


Acute gastroenteritis


Dehydration


Severe B/L hydronephrosis


Severe ID


Paranoid schizo


Ess Htn


BPH


Depression


LE DVT


Epilepsy


 








- Plan


Plan: 


Lab - Result Diagrams





 18 04:25 





 18 04:25 





Current Medications





Acetaminophen (Tylenol)  650 mg PO Q6H PRN


   PRN Reason: fever/pain


   Stop: 19 13:42


Calcium/Vitamin D (Oscal W/Vitamin D)  1 tab PO BID [facility]


   Stop: 19 16:59


   Last Admin: 18 10:17 Dose:  1 tab


Cholecalciferol (Vitamin D3)  1,000 iu PO DAILY [facility]


   Stop: 19 13:44


   Last Admin: 18 10:17 Dose:  1,000 iu


Divalproex Sodium (Depakote Er)  500 mg PO TID [facility]; Protocol


   Stop: 19 13:59


   Last Admin: 18 14:21 Dose:  500 mg


Docusate Sodium (Colace)  100 mg PO DAILY [facility]


   Stop: 19 13:44


   Last Admin: 18 10:17 Dose:  100 mg


Escitalopram Oxalate (Lexapro)  10 mg PO DAILY [facility]; Protocol


   Stop: 19 13:44


   Last Admin: 18 10:16 Dose:  10 mg


Finasteride (Proscar)  5 mg PO HS [facility]; Protocol


   Stop: 19 20:59


   Last Admin: 18 21:05 Dose:  5 mg


Sodium Chloride (Nacl 0.9%)  1,000 mls @ 100 mls/hr IV .Q10H [facility]


   Stop: 19 17:44


   Last Admin: 18 03:31 Dose:  100 mls/hr


Ceftriaxone Sodium 1 gm/ (Sodium Chloride)  50 mls @ 100 mls/hr IV Q24HR [facility]


   Stop: 19 16:59


   Last Admin: 18 16:19 Dose:  100 mls/hr


Ciprofloxacin (Cipro 200mg Premix Pb)  200 mg in 100 mls @ 100 mls/hr IV Q24HR 

[facility]


   Stop: 19 09:44


   Last Admin: 18 10:21 Dose:  100 mls/hr


Magnesium Hydroxide (Milk Of Magnesia)  30 ml PO DAILY PRN


   PRN Reason: Constipation


   Stop: 19 13:45


Metoprolol Tartrate (Lopressor)  25 mg PO BID [facility]


   Stop: 19 16:59


   Last Admin: 18 10:16 Dose:  25 mg


Olanzapine (Zyprexa)  5 mg PO HS [facility]; Protocol


   Stop: 19 20:59


   Last Admin: 18 21:05 Dose:  5 mg


Oxybutynin Chloride (Ditropan)  10 mg PO DAILY [facility]


   Stop: 19 08:59


   Last Admin: 18 10:16 Dose:  10 mg


Pantoprazole Sodium (Protonix)  40 mg PO DAILY [facility]


   Stop: 19 08:59


   Last Admin: 18 10:17 Dose:  40 mg


Rivaroxaban (Xarelto)  10 mg PO DAILY [facility]


   Stop: 19 08:59


   Last Admin: 18 10:17 Dose:  10 mg


Tamsulosin HCl (Flomax)  0.4 mg PO [facility]


   Stop: 19 20:59


   Last Admin: 18 21:05 Dose:  0.4 mg





Lab - Result Diagrams





 18 04:25 





 18 04:25 





Kidney fnc gradually improving w/ BUN/CR of 39/1.9


no further N/V/D


Renal US revealed severe B/L hydroneprosis but nondistended bladder


possible urethral stricture due to scar, fibrosis, mass


continue IVF, encourage po intake


Urology consult


f/u electrolytes, cbc








Nutritional Asmnt/Malnutr-PDOC





- Dietary Evaluation


Malnutrition Findings (Please click <Entered> for more info): 








Nutritional Asmnt/Malnutrition                             Start:  18 11:

06


Text:                                                      Status: Complete    

  


Freq:                                                                          

  


Protocol:                                                                      

  


 Document     18 11:07  ALECIACHERELLE  (Rec: 18 11:35  AICHACORINNA [id number])


 Nutritional Asmnt/Malnutrition


     Patient General Information


      Nutritional Screening                      High Risk


                                                 Consult


      Diagnosis                                  acute renal insufficient,


                                                 urosepsis


      Pertinent Medical Hx/Surgical Hx           HTN, dementia, depression,


                                                 schizophrenia


      Subjective Information                     Consult receved for wound. Pt


                                                 seen lying in bed at time of


                                                 visit, awake, non-verbal. Per


                                                 CNA, pt only consumed liquid


                                                 from breakfast tray today.


                                                 Observed pt was able to eat


                                                 chocolate chip cookie, pt did


                                                 not want the soup.


      Current Diet Order/ Nutrition Support      low sodium


      Pertinent Medications                      nacl 0.9%


      Pertinent Labs                              cl 109, BUN 60, Cr 2.5,


                                                 Glucose 84


                                                  BUN 68, Cr 2.9, glucose


                                                 134


     Nutritional Hx/Data


      Height                                     1.68 m


      Height (Calculated Centimeters)            167.6


      Current Weight (lbs)                       63.957 kg


      Weight (Calculated Kilograms)              64.0


      Weight (Calculated Grams)                  77619.5


      Ideal Body Weight                          142


      Body Mass Index (BMI)                      22.7


     GI Symptoms


      GI Symptoms                                None


      Difficult in:                              None


      Skin Integrity/Comment:                    reddened to left arm, left


                                                 buttocks, LT back big toe


                                                 open wound to left toe, left


                                                 lower leg


                                                 scar to left ankle


                                                 refugio score 11


      Current %PO                                Good (%)


     Estimated Nutritional Goals


      BEE in Kcals:                              Using Current wt


      Calories/Kcals/Kg                          25-30


      Kcals Calculated                           [id number]


      Protein:                                   Using Current wt


      Protein g/k.7-0.8 monitor renal labs


      Protein Calculated                         45-51


      Fluid: ml                                  per MD


     Nutritional Problem


      2. Problem


       Problem                                   increased nutrition needs


       Etiology                                  increased metabolic demand for


                                                 wound healing


       Signs/Symptoms:                           open wound


      1. Problem


       Problem                                   altered nutrition related labs


       Etiology                                  renal dysfunction


       Signs/Symptoms:                           BUN 60, Cr 2.5


     Malnutrition Alert


      Is there a minimum of two criteria         No


       selected?                                 


       Query Text:Check all the applicable       


       criteria. A minimum of two criteria are   


       recommended for diagnosis of either       


       severe or non-severe malnutrition.        


     Malnutrition Related to Morbid Obesity


      Malnutrition related to morbid obesity     No


     Intervention/Recommendation


      Comments                                   1. Continue with low sodium


                                                 diet as ordered. Consider


                                                 adding supplements if PO


                                                 intake not meeting nutritional


                                                 needs. Nurses to assist pt


                                                 with all meals.


                                                 2. Add Manuel BID fot wound


                                                 healing.


                                                 3. Monitor renal labs.


                                                 Consider to limit protein


                                                 intake if BUN/Cr continue


                                                 elevated when PO intake >75%.


                                                 4. Monitor PO intake, wt, labs


                                                 and skin integrity


                                                 5. F/U as high risk in 2-3


                                                 days, -, PO check 


     Expected Outcomes/Goals


      Expected Outcomes/Goals                    1. PO intake to meet at least


                                                 75% of nutritional needs.


                                                 2. Wt stability, skin to


                                                 remain intact, labs to


                                                 approach WNL.

## 2018-11-23 NOTE — HISTORY & PHYSICAL
ADMIT DATE:     11/22/2018



CHIEF COMPLAINT:  Nausea, vomiting and weakness.



HISTORY OF PRESENT ILLNESS:  The patient is a 73-year-old gentleman with a

history of depression, essential hypertension, BPH, history of lower extremity

DVT, psychosis, who presented from [facility] with 6 episodes 

of nausea and vomiting.  He is a poor historian overall, but per ER staff there 
was no mention

of abdominal pain, fever, chills, cough or phlegm production.  Pertinent

findings on admission include a white count of 11.4, BUN and creatinine of

68/2.9 and UA consistent with UTI.  The patient has been admitted to the

telemetry miner for further management and care.



PAST MEDICAL HISTORY:  As noted above.  Unspecified psych disorder.



PAST SURGICAL HISTORY:  None per records.



SOCIAL HISTORY:  No tobacco, ETOH or illicit drug usage.  He lives at [facility].



ALLERGIES:  ALLERGIC TO NIACIN.



OUTPATIENT MEDICATIONS:  Keflex 500 mg q.8, Altace 10 b.i.d., acetaminophen 650

q.6 hour p.r.n. for pain, calcium with vitamin D3 b.i.d., vitamin D3 1000

international units every day, Dexilant 60 q.a.c. and at bedtime, Depakote 500

mg t.i.d., Colace 100 mg daily, Lexapro 10 mg every daily, Proscar 5 mg at

bedtime, milk of magnesia 30 mL every daily p.r.n., metoprolol 25 b.i.d.,

multivitamins and minerals daily, olanzapine 5 mg at bedtime, omeprazole 20

q.a.c., oxybutynin 30 mg daily, Xarelto 20 mg every day, VESIcare 5 mg daily,

Flomax 0.4 at bedtime.



REVIEW OF SYSTEMS:

GENERAL:  There are no reports of fever, chills or recent weight loss.

CARDIOVASCULAR:  No chest pain or palpitations.

PULMONARY:  No cough or phlegm production.  No shortness of breath.

GASTROINTESTINAL:  Nausea, vomiting as noted above, but there is no diarrhea or

abdominal pain reported.

GENITOURINARY:  No dysuria or hematuria reported.

NEUROLOGIC:  No changes in vision, no headaches, no syncope.



PHYSICAL EXAMINATION:

VITAL SIGNS:  Temperature 97.0, pulse 71, respirations 18, /69 with sats

of 96% on room air.

GENERAL:  He is a well-developed, well-nourished male lying in bed with no acute

distress.  He is nontoxic appearing.

HEAD AND NECK:  Normocephalic, atraumatic.  Pupils reactive to light. 

Extraocular movements are intact.  Oropharynx is moist and clear.

NECK:  There is no JVD or LAD.

CARDIAC:  Regular rate and rhythm without any murmurs.

LUNGS:  Diminished at the bases, but otherwise clear to auscultation

bilaterally.

ABDOMEN:  Soft, supple.  Currently, nontender, nondistended, normoactive bowel

sounds.

EXTREMITIES:  Lower extremity, there is no pedal edema.



LABORATORY DATA:  On admission, white count 11.4, H and H 12/35, platelet count

273.  INR 1.30.  BUN 68, creatinine 2.9, glucose 134, otherwise all the

chemistries were within normal limits, alk phos 25.  Troponins are negative x 1

set.  BNP 51.  Albumin 3.5.  TSH 5.8.  UA shows positive for protein, large

blood, positive for nitrites, large leukocyte esterase with 5-10 rbcs and 

wbcs.



DIAGNOSTIC DATA:  EKG shows normal sinus rhythm at a rate of 62, no obvious

abnormalities noted.



IMPRESSION:

1.  Urinary tract infection.

2.  Gastroenteritis.

3.  Acute renal insufficiency.

4.  Leukocytosis.

5.  History of essential hypertension.

6.  History of depression/psych disorder.

7.  History of lower extremity deep venous thrombosis.

8.  Benign prostatic hypertrophy.



PLAN:  The patient has been admitted to a telemetry miner for further management

and care.  He has been placed on IV fluids, IV antibiotics and has been

pancultured.  He will also be kept on his other medications except for Altace

given his renal insufficiency.  We will ask for a renal consult as well as a

kidney ultrasound for management and care.





DD: 11/23/2018 09:40

DT: 11/23/2018 11:53

JOB# [id number]  [id number]

ARNALDO

## 2018-11-23 NOTE — CONSULTATION
DATE OF CONSULTATION:  11/22/2018



ATTENDING:  Dr. JOSE G Ferrell.



CONSULTANT:  Kevin Ordonez M.D.



REASON FOR CONSULTATION:  Worsening kidney function, electrolyte imbalance, and

fluid management.



HISTORY OF PRESENT ILLNESS:  This is a 73-year-old  male with past

medical history of severe intellectual disability, who came in because of nausea

and vomiting.



Two days prior to admission, the patient developed nausea and vomiting and

subsequently diarrhea x 6.



A few hours prior to admission, his condition deteriorated.  He became very

weak.  He was then brought to the Emergency Room.



His temperature was 96.6 with a white count of 11.4.



He has no history of kidney failure; however, his BUN/creatinine were 68/2.9. 

His urinalysis was suggestive of a complicated UTI.



PAST MEDICAL HISTORY:

1.  Severe intellectual disability.

2.  Paranoid schizophrenia.

3.  Essential hypertension.

4.  BPH.

5.  Depression.

6.  Lower extremity DVT.

7.  Epilepsy.



CURRENT MEDICATIONS:  He is currently on acetaminophen, calcium carbonate,

ceftriaxone, cholecalciferol, Cipro, divalproex, docusate sodium, escitalopram,

finasteride, magnesium hydroxide, metoprolol, olanzapine, oxybutynin,

pantoprazole, rivaroxaban, tamsulosin.



ALLERGIES:  NIACIN.



SOCIAL AND FAMILY HISTORY:  I was not able to obtain directly from the patient

because he remains nonverbal at the present time.



REVIEW OF SYSTEMS:  Again, I was not able to decipher directly from the patient

because of the same reason.



PHYSICAL EXAMINATION:

GENERAL:  The patient is awake, remains nonverbal, but not in any form of

distress.

VITAL SIGNS:  His blood pressure 118/70, pulse 76, temperature 98.1 degrees.

SKIN:  Poor turgor, warm, no rash, no jaundice appreciated.

HEENT:  Head normocephalic, atraumatic.  Eyes:  Extraocular muscles intact. 

Anicteric sclerae.  Pink conjunctivae.  Nose, midline nasal septum.  Mouth, dry

mucosa, adequate dentition.

NECK:  Supple, no adenopathy, no thyromegaly, no bruits.  Trachea palpated in

the midline.

CHEST AND CARDIOVASCULAR:  S1, S2.  No rub, murmur nor gallop appreciated. 

Point of maximal impulse fifth intercostal space, left midclavicular line.  No

abdominal or femoral bruits appreciated.

LUNGS:  Equal expansion, no use of accessory muscles.  No supraclavicular

retractions.  Decreased breath sounds, clear to auscultation without any wheeze.

ABDOMEN:  Mildly globular, soft.  Positive for bowel sounds.  No bruits either

diastolic or systolic.

RECTAL:  Lax sphincter tone.

GENITOURINARY:  Normal appearing male genitalia.

MUSCULOSKELETAL:  No effusions present in his joints with adequate range of

motion.

EXTREMITIES:  No evidence of any edema, cyanosis nor clubbing with palpable

femoral, popliteal and dorsalis pedis pulses.

NEUROLOGIC:  The patient is awake; however, unable to follow my neuro commands,

so I was not able to pursue further my neuro exam.



LABORATORY DATA:  Labs did reveal white count 12.6, hemoglobin 11.1, hematocrit

31.7, platelets 214, polys 75.1%.  Sodium 138, potassium 4.7, chloride 107,

bicarb 22, BUN 39, creatinine 1.9, glucose 85, calcium 8.9, phosphorus 2.6,

magnesium 1.6, albumin 3.5.  TSH 5.8.



IMPRESSION:

1.  Acute kidney injury.



The patient has had nausea and vomiting along with diarrhea for several days

now.  He has not been able to replenish both sensible and insensible fluid

losses.  He developed dehydration.  This was supported by his dry oral mucosa,

poor skin turgor as well as well as low blood pressure.  His prerenal azotemia

eventually progressed to acute tubular injury.



He also has ongoing complicated UTI, which could give rise to development of

acute interstitial nephritis.



He has a longstanding history of BPH and currently on tamsulosin as well as

Proscar.  Thus, he may have post-renal component in the form of outlet

obstruction.

2.  Nausea and vomiting along with diarrhea is likely secondary to acute

gastroenteritis.

3.  Dehydration.

4.  Severe intellectual disability.

5.  Paranoid schizophrenia.

6.  Essential hypertension.

7.  Benign prostatic hypertrophy.

8.  Status post depression.

9.  Lower extremity deep venous thrombosis.

10.  Epilepsy.



PLAN:

1.  Continue with IV hydration.

2.  Urine C and S.

3.  Rocephin and Cipro.

4.  Urine sodium, eosinophils, and creatinine.

5.  Urine microalbumin to creatinine ratio.

6.  Renal ultrasound.

7.  Encourage p.o. intake.



Thank you, Dr. Ferrell for this consult.  We will follow the patient closely with

you.





DD: 11/23/2018 17:14

DT: 11/23/2018 23:23

[facility]# [id number]  [id number]

## 2018-11-23 NOTE — DIAGNOSTIC IMAGING REPORT
Renal ultrasound



HISTORY: Acute Renal failure.



COMPARISON: None



Technique: Sonography of the kidneys and urinary bladder was performed

in multiple planes.



FINDINGS: 



Exam is limited due to body habitus.  



The right kidney measures 12.3 x 6.7 cm demonstrating severe

hydronephrosis.  The left kidney measures 10.3 x 5.7 cm demonstrates

severe hydronephrosis.  Assessment for focal lesions is limited on this

exam.  There is probable bilateral renal scarring.  



The urinary bladder is underdistended, limiting its evaluation.  Mild

urinary bladder wall thickening is noted.  Assessment of the prostate

gland was limited on this exam.



IMPRESSION:



Limited exam due to body habitus.



Severe bilateral hydronephrosis.  Please correlate with clinical

findings.  Consider further assessment with CT examination.



Nondistended urinary bladder.  Mild urinary bladder wall thickening is

noted.  Inflammatory or infectious process cannot be excluded.

## 2018-11-24 LAB
ALBUMIN SERPL-MCNC: 2.9 GM/DL (ref 4.2–5.5)
ALBUMIN/GLOB SERPL: 0.9 {RATIO} (ref 1–1.8)
ALP SERPL-CCNC: 27 U/L (ref 34–104)
ALT SERPL-CCNC: 10 U/L (ref 7–52)
ANION GAP SERPL CALC-SCNC: 11.8 MMOL/L (ref 7–16)
AST SERPL-CCNC: 19 U/L (ref 13–39)
BASOPHILS # BLD AUTO: 0.1 TH/CUMM (ref 0–0.2)
BASOPHILS NFR BLD AUTO: 0.4 % (ref 0–2)
BILIRUB SERPL-MCNC: 0.4 MG/DL (ref 0.3–1)
BILIRUB UR-MCNC: NEGATIVE MG/DL
BUN SERPL-MCNC: 29 MG/DL (ref 7–25)
CALCIUM SERPL-MCNC: 9 MG/DL (ref 8.6–10.3)
CHLORIDE SERPL-SCNC: 108 MEQ/L (ref 98–107)
CHOLEST SERPL-MCNC: 126 MG/DL (ref ?–200)
CO2 SERPL-SCNC: 23.7 MEQ/L (ref 21–31)
CREAT SERPL-MCNC: 1.9 MG/DL (ref 0.7–1.3)
EOSINOPHIL # BLD AUTO: 0.3 TH/CMM (ref 0.1–0.4)
EOSINOPHIL NFR BLD AUTO: 2 % (ref 0–5)
ERYTHROCYTE [DISTWIDTH] IN BLOOD BY AUTOMATED COUNT: 12.1 % (ref 11.5–20)
GLOBULIN SER-MCNC: 3.3 GM/DL
GLUCOSE SERPL-MCNC: 92 MG/DL (ref 70–105)
GLUCOSE UR STRIP-MCNC: NEGATIVE MG/DL
HCT VFR BLD CALC: 31.7 % (ref 41–60)
HDLC SERPL-MCNC: 27 MG/DL (ref 23–92)
HGB BLD-MCNC: 10.9 GM/DL (ref 12–16)
INR PPP: 1.13 (ref 0.5–1.4)
KETONES UR STRIP-MCNC: NEGATIVE MG/DL
LEUKOCYTE ESTERASE UR-ACNC: (no result)
LYMPHOCYTE AB SER FC-ACNC: 1.5 TH/CMM (ref 1.5–3)
LYMPHOCYTES NFR BLD AUTO: 10.1 % (ref 20–50)
MAGNESIUM SERPL-MCNC: 1.9 MG/DL (ref 1.9–2.7)
MCH RBC QN AUTO: 33.1 PG (ref 27–31)
MCHC RBC AUTO-ENTMCNC: 34.5 PG (ref 28–36)
MCV RBC AUTO: 95.9 FL (ref 80–99)
MICRO URNS: YES
MONOCYTES # BLD AUTO: 1.2 TH/CMM (ref 0.3–1)
MONOCYTES NFR BLD AUTO: 8.1 % (ref 2–10)
NEUTROPHILS # BLD: 11.5 TH/CMM (ref 1.8–8)
NEUTROPHILS NFR BLD AUTO: 79.4 % (ref 40–80)
NITRITE UR QL STRIP: NEGATIVE
PH UR STRIP: 5.5 [PH] (ref 4.6–8)
PLATELET # BLD: 208 TH/CMM (ref 150–400)
PMV BLD AUTO: 6.7 FL
POTASSIUM SERPL-SCNC: 4.5 MEQ/L (ref 3.5–5.1)
PROT UR STRIP-MCNC: NEGATIVE MG/DL
PROTHROMBIN TIME: 11.6 SECONDS (ref 9.5–11.5)
RBC # BLD AUTO: 3.3 MIL/CMM (ref 3.8–5.8)
RBC # UR STRIP: (no result) /UL
SODIUM SERPL-SCNC: 139 MEQ/L (ref 136–145)
SP GR UR STRIP: 1.01 (ref 1–1.03)
TRIGL SERPL-MCNC: 176 MG/DL (ref ?–150)
URINALYSIS COMPLETE PNL UR: (no result)
UROBILINOGEN UR STRIP-ACNC: 0.2 E.U./DL (ref 0.2–1)
WBC # BLD AUTO: 14.6 TH/CMM (ref 4.8–10.8)

## 2018-11-24 RX ADMIN — OYSTER SHELL CALCIUM WITH VITAMIN D SCH TAB: 500; 200 TABLET, FILM COATED ORAL at 09:20

## 2018-11-24 RX ADMIN — PANTOPRAZOLE SODIUM SCH MG: 40 TABLET, DELAYED RELEASE ORAL at 09:21

## 2018-11-24 RX ADMIN — CIPROFLOXACIN SCH MLS/HR: 2 INJECTION, SOLUTION INTRAVENOUS at 16:21

## 2018-11-24 RX ADMIN — OYSTER SHELL CALCIUM WITH VITAMIN D SCH TAB: 500; 200 TABLET, FILM COATED ORAL at 19:41

## 2018-11-24 NOTE — CONSULTATION
DATE OF CONSULTATION:  11/24/2018



HISTORY OF PRESENT ILLNESS:  This 73-year-old male who was seen and examined. 

There is no history available from the patient and the patient has been

uncooperative on nurses.  He is refusing the lab and echocardiograms to be done.

 From the chart the patient was admitted here with a urinary tract infection,

gastroenteritis, leukocytosis, acute renal insufficiency, history of

hypertension, history of depression, psychotic disorders and benign prostatic

hypertrophy.  Apparently, this patient has been seen by Urologist and possibly

is scheduled for surgery, TURP.  The patient does not give any history.  On

direct questioning him sometimes he answers.  There was no history of chest

pains, no shortness of breath, no history of PND, no history of orthopnea, no

history of cough, no history of fever, no history of hemoptysis.  No history of

dizziness.  No history of swelling over the legs.



PAST MEDICAL HISTORY:  As mentioned above.



FAMILY HISTORY:  Not available from the patient.



REVIEW OF SYSTEMS:  As mentioned above.  History of smoking and alcohol is not

available to me.



PHYSICAL EXAMINATION:

VITAL SIGNS:  Heart rate was 70, blood pressure is 140/60, respiration 16-18,

temperature 98.5.

SKIN:  Normal.

HEAD:  Normocephalic.

EYES:  Conjunctivae were pink.  There is no icterus in the eyes.  Pupils

reactive to light.

NECK:  There were no increased jugular venous distention, no thyromegaly, no

lymphadenopathy.  Carotids equal both sides.

CHEST:  Bilaterally symmetrical, moved well with respiration.  Respiratory

movements equal both sides.  Trachea is central.  There is note to percussion. 

Breath sound normal.

CARDIOVASCULAR SYSTEM:  PMI not well localized and no positional thrill.  No

parasternal heave.  S1 normal, S2 physiologic.  There were no S3, no rub.

ABDOMEN:  Soft, no tenderness, no rigidity, no guarding, no organomegaly.  Bowel

sounds normal.

ABDOMEN:  Bowel sounds normal.

CENTRAL NERVOUS SYSTEM:  Did not allow me to check any reflexes or sensations.



LABORATORY DATA:  Looking at the labs, urine culture showed E. coli 100,000

colony counts as started to levofloxacin.  WBC was 12.6, hemoglobin 11.1,

hematocrit 31.7, platelet count was 214.  Sodium 138, potassium 4.8, chloride

107, CO2 of 22.2, glucose is 85, BUN 39, creatinine 1.9.  BNP 51.8.  Troponin

was 0.02, cholesterol 160, triglycerides 201, HDL 36, .  Amylase 29,

lipase 63.  INR 1.30.  Renal ultrasound showed severe bilateral hydronephrosis. 

Please correlate clinical findings.  Nondistended urinary bladder, a bladder

wall thickening is noted.  EKG showed sinus rhythm, interventricular conduction

delay, poor R-wave progression in the precordial leads, which are due to chronic

obstructive pulmonary disease or some damage to the anterior forces.



IMPRESSION:  Hypertension, hyperlipidemia, history of DVT, BPH, UTI, renal

insufficiency, psych disorder, depression, history of recurrent psychotic

symptoms, uncooperative patient.  Some labs and echocardiogram was ordered last

night on this patient, but the patient has refused to do lab and the

echocardiogram also.  We may need to get echocardiogram to evaluate left

ventricular function and valvular structure, left ventricular wall motion study.

 The patient should be on Lipitor also at least 20 mg daily because LDL is high,

so we should try to get an echocardiogram in a.m.  Repeat EKG.  The patient

should be on some statins.  Blood pressure medication is ARB.



Thank you.  Further recommendation will be made depending on the list of the

tests available.





DD: 11/24/2018 22:55

DT: 11/24/2018 23:45

JOB# [id number]  [id number]

## 2018-11-24 NOTE — GENERAL PROGRESS NOTE
Subjective





- Review of Systems


Service Date: 18


Subjective: 





sleeping, comfortable; no further N/V, diarrhea





Objective





- Results


Result Diagrams: 


 18 07:45





 18 07:45


Recent Labs: 


 Laboratory Last Values











WBC  14.6 Th/cmm (4.8-10.8)  H  18  07:45    


 


RBC  3.30 Mil/cmm (3.80-5.80)  L  18  07:45    


 


Hgb  10.9 gm/dL (12-16)  L  18  07:45    


 


Hct  31.7 % (41.0-60)  L  18  07:45    


 


MCV  95.9 fl (80-99)   18  07:45    


 


MCH  33.1 pg (27.0-31.0)  H  18  07:45    


 


MCHC Differential  34.5 pg (28.0-36.0)   18  07:45    


 


RDW  12.1 % (11.5-20.0)   18  07:45    


 


Plt Count  208 Th/cmm (150-400)   18  07:45    


 


MPV  6.7 fl  18  07:45    


 


Neutrophils %  79.4 % (40.0-80.0)   18  07:45    


 


Lymphocytes %  10.1 % (20.0-50.0)  L  18  07:45    


 


Monocytes %  8.1 % (2.0-10.0)   18  07:45    


 


Eosinophils %  2.0 % (0.0-5.0)   18  07:45    


 


Basophils %  0.4 % (0.0-2.0)   18  07:45    


 


Eos Smear Source  URINE   18  11:55    


 


Eos Smear Total Cells  NONE SEEN  (NONE SEEN)   18  11:55    


 


PT  11.6 SECONDS (9.5-11.5)  H  18  07:45    


 


INR  1.13  (0.5-1.4)   18  07:45    


 


PTT (Actin FS)  24.2 SECONDS (26.0-38.0)  L  18  07:45    


 


Sodium  139 mEq/L (136-145)   18  07:45    


 


Potassium  4.5 mEq/L (3.5-5.1)   18  07:45    


 


Chloride  108 mEq/L ()  H  18  07:45    


 


Carbon Dioxide  23.7 mEq/L (21.0-31.0)   18  07:45    


 


Anion Gap  11.8  (7.0-16.0)   18  07:45    


 


BUN  29 mg/dL (7-25)  H  18  07:45    


 


Creatinine  1.9 mg/dL (0.7-1.3)  H  18  07:45    


 


Est GFR ( Amer)  TNP   18  07:45    


 


Est GFR (Non-Af Amer)  TNP   18  07:45    


 


BUN/Creatinine Ratio  15.3   18  07:45    


 


Glucose  92 mg/dL ()   18  07:45    


 


Calcium  9.0 mg/dL (8.6-10.3)   18  07:45    


 


Phosphorus  2.6 mg/dL (2.5-5.0)   18  04:25    


 


Magnesium  1.9 mg/dL (1.9-2.7)   18  07:45    


 


Total Bilirubin  0.4 mg/dL (0.3-1.0)   18  07:45    


 


AST  19 U/L (13-39)   18  07:45    


 


ALT  10 U/L (7-52)   18  07:45    


 


Alkaline Phosphatase  27 U/L ()  L  18  07:45    


 


Creatine Kinase  27 U/L ()  L  18  13:50    


 


Troponin I  0.02 ng/mL (0.01-0.05)   18  13:50    


 


B-Natriuretic Peptide  51.8 pg/mL (5.0-100.0)   18  13:50    


 


Total Protein  6.2 gm/dL (6.0-8.3)   18  07:45    


 


Albumin  2.9 gm/dL (4.2-5.5)  L  18  07:45    


 


Globulin  3.3 gm/dL  18  07:45    


 


Albumin/Globulin Ratio  0.9  (1.0-1.8)  L  18  07:45    


 


Triglycerides  176 mg/dL (<150)  H  18  07:45    


 


Cholesterol  126 mg/dL (<200)   18  07:45    


 


LDL Cholesterol Direct  77 mg/dL ()   18  07:45    


 


HDL Cholesterol  27 mg/dL (23-92)   18  07:45    


 


Amylase  29 U/L ()   18  13:50    


 


Lipase  63 U/L (11-82)   18  13:50    


 


TSH  5.80 uIU/ml (0.34-5.60)  H  18  06:05    


 


Urine Source  CLEAN C   18  15:15    


 


Urine Color  YELLOW   18  15:15    


 


Urine Clarity  CLOUDY  (CLEAR)   18  15:15    


 


Urine pH  6.5  (4.6 - 8.0)   18  15:15    


 


Ur Specific Gravity  1.020  (1.005-1.030)   18  15:15    


 


Urine Protein  100 mg/dL (NEGATIVE)  H  18  15:15    


 


Urine Glucose (UA)  NEGATIVE mg/dL (NEGATIVE)   18  15:15    


 


Urine Ketones  NEGATIVE mg/dL (NEGATIVE)   18  15:15    


 


Urine Blood  LARGE  (NEGATIVE)  H  18  15:15    


 


Urine Nitrate  POSITIVE  (NEGATIVE)  H  18  15:15    


 


Urine Bilirubin  NEGATIVE  (NEGATIVE)   18  15:15    


 


Urine Urobilinogen  0.2 E.U./dL (0.2 - 1.0)   18  15:15    


 


Ur Leukocyte Esterase  LARGE  (NEGATIVE)  H  18  15:15    


 


Urine RBC  5-10 /hpf (0-5)  H  18  15:15    


 


Urine WBC   /hpf (0-5)  H  18  15:15    


 


Ur Epithelial Cells  FEW /lpf (FEW)   18  15:15    


 


Urine Bacteria  MANY /hpf (NONE SEEN)  H  18  15:15    


 


Ur Random Sodium  119 mmol/L  18  11:55    


 


Urine Creatinine  74.0 mg/dl (39.0-259.0)   18  11:55    


 


Microalb/Creat Ratio  195.2 mg/g creat (0.0-30.0)  H  18  11:55    














- Physical Exam


Vitals and I&O: 


 Vital Signs











Temp  97.4 F   18 11:41


 


Pulse  61   18 11:41


 


Resp  18   18 11:41


 


BP  121/63   18 11:41


 


Pulse Ox  97   18 11:41








 Intake & Output











 18





 18:59 06:59 18:59


 


Intake Total 1550  1000


 


Balance 1550  1000


 


Weight (lbs) 63.957 kg  


 


Intake:   


 


  Intake, IV Amount 1000  1000


 


    Sodium Chloride 0.9% 1, 1000  1000





    000 ml @ 100 mls/hr IV .   





    Q10H [facility] Rx#:[id number]   


 


  Oral 550  


 


Other:   


 


  # Voids 3  


 


  # Bowel Movements 1  


 


  Stool Characteristics Soft  





 Formed  


 


  Weight Source Bedscale  











Active Medications: 


Current Medications





Acetaminophen (Tylenol)  650 mg PO Q6H PRN


   PRN Reason: fever/pain


   Stop: 19 13:42


Calcium/Vitamin D (Oscal W/Vitamin D)  1 tab PO BID [facility]


   Stop: 19 16:59


   Last Admin: 18 09:20 Dose:  1 tab


Cholecalciferol (Vitamin D3)  1,000 iu PO DAILY [facility]


   Stop: 19 13:44


   Last Admin: 18 09:21 Dose:  1,000 iu


Divalproex Sodium (Depakote Er)  500 mg PO TID [facility]; Protocol


   Stop: 19 13:59


   Last Admin: 18 09:21 Dose:  500 mg


Docusate Sodium (Colace)  100 mg PO DAILY [facility]


   Stop: 19 13:44


   Last Admin: 18 09:21 Dose:  100 mg


Escitalopram Oxalate (Lexapro)  10 mg PO DAILY [facility]; Protocol


   Stop: 19 13:44


   Last Admin: 18 09:21 Dose:  10 mg


Finasteride (Proscar)  5 mg PO HS [facility]; Protocol


   Stop: 19 20:59


   Last Admin: 18 20:15 Dose:  5 mg


Sodium Chloride (Nacl 0.9%)  1,000 mls @ 100 mls/hr IV .Q10H [facility]


   Stop: 19 17:44


   Last Admin: 18 09:19 Dose:  100 mls/hr


Ciprofloxacin (Cipro 200mg Premix Pb)  200 mg in 100 mls @ 100 mls/hr IV Q24HR 

[facility]


   Stop: 19 09:44


   Last Admin: 18 10:21 Dose:  100 mls/hr


Vancomycin HCl 1 gm/ Sodium (Chloride)  250 mls @ 166.667 mls/hr IV Q24H [facility]


   Stop: 19 13:44


Magnesium Hydroxide (Milk Of Magnesia)  30 ml PO DAILY PRN


   PRN Reason: Constipation


   Stop: 19 13:45


Metoprolol Tartrate (Lopressor)  25 mg PO BID [facility]


   Stop: 19 16:59


   Last Admin: 18 09:22 Dose:  25 mg


Olanzapine (Zyprexa)  5 mg PO HS [facility]; Protocol


   Stop: 19 20:59


   Last Admin: 18 20:16 Dose:  5 mg


Oxybutynin Chloride (Ditropan)  10 mg PO DAILY [facility]


   Stop: 19 08:59


   Last Admin: 18 09:21 Dose:  10 mg


Pantoprazole Sodium (Protonix)  40 mg PO DAILY [facility]


   Stop: 19 08:59


   Last Admin: 18 09:21 Dose:  40 mg


Tamsulosin HCl (Flomax)  0.4 mg PO BID [facility]


   Stop: 19 16:59








General: Alert, No acute distress


HEENT: Atraumatic, Mucous membr. moist/pink


Neck: Supple, +2 carotid pulse wo bruit


Cardiovascular: Regular rate, Normal S1, Normal S2


Lungs: Clear to auscultation


Abdomen: Bowel sounds, Soft


Extremities: no Edema


Neurological: Sensation intact


Skin: no Significant lesion


Psych/Mental Status: Mood NL





- Procedures


Procedures: 


 Procedures











Procedure Code Date


 


C & S-INTEGUMENT 91.63 95


 


CULTURE OTHR SPECIMN AEROBIC 23954 95


 


DX ULTRASOUND-VASCULAR 88.77 95


 


ELECTROCARDIOGRAPH MONIT 89.54 95


 


INJECT ANTIBIOTIC 99.21 10/09/95


 


INJECT ANTICOAGULANT 99.19 95


 


MICROBE SUSCEPTIBLE DISK 05801 95


 


OTHER C.A.T. SCAN 88.38 95


 


URINARY SYSTEM X-RAY NEC 87.79 95


 


WHIRLPOOL THERAPY 30829 95


 


WHIRLPOOL TREATMENT 93.32 95














Assessment/Plan





- Assessment


Assessment: 





DEVIN


Acute gastroenteritis


Dehydration


Severe B/L hydronephrosis


Severe ID


Paranoid schizo


Ess Htn


BPH


Depression


LE DVT


Epilepsy


 








- Plan


Plan: 


Lab - Result Diagrams





 18 04:25 





 18 04:25 





Current Medications





Acetaminophen (Tylenol)  650 mg PO Q6H PRN


   PRN Reason: fever/pain


   Stop: 19 13:42


Calcium/Vitamin D (Oscal W/Vitamin D)  1 tab PO BID [facility]


   Stop: 19 16:59


   Last Admin: 18 10:17 Dose:  1 tab


Cholecalciferol (Vitamin D3)  1,000 iu PO DAILY [facility]


   Stop: 19 13:44


   Last Admin: 18 10:17 Dose:  1,000 iu


Divalproex Sodium (Depakote Er)  500 mg PO TID [facility]; Protocol


   Stop: 19 13:59


   Last Admin: 18 14:21 Dose:  500 mg


Docusate Sodium (Colace)  100 mg PO DAILY [facility]


   Stop: 19 13:44


   Last Admin: 18 10:17 Dose:  100 mg


Escitalopram Oxalate (Lexapro)  10 mg PO DAILY [facility]; Protocol


   Stop: 19 13:44


   Last Admin: 18 10:16 Dose:  10 mg


Finasteride (Proscar)  5 mg PO HS [facility]; Protocol


   Stop: 19 20:59


   Last Admin: 18 21:05 Dose:  5 mg


Sodium Chloride (Nacl 0.9%)  1,000 mls @ 100 mls/hr IV .Q10H [facility]


   Stop: 19 17:44


   Last Admin: 18 03:31 Dose:  100 mls/hr


Ceftriaxone Sodium 1 gm/ (Sodium Chloride)  50 mls @ 100 mls/hr IV Q24HR [facility]


   Stop: 19 16:59


   Last Admin: 18 16:19 Dose:  100 mls/hr


Ciprofloxacin (Cipro 200mg Premix Pb)  200 mg in 100 mls @ 100 mls/hr IV Q24HR 

[facility]


   Stop: 19 09:44


   Last Admin: 18 10:21 Dose:  100 mls/hr


Magnesium Hydroxide (Milk Of Magnesia)  30 ml PO DAILY PRN


   PRN Reason: Constipation


   Stop: 19 13:45


Metoprolol Tartrate (Lopressor)  25 mg PO BID [facility]


   Stop: 19 16:59


   Last Admin: 18 10:16 Dose:  25 mg


Olanzapine (Zyprexa)  5 mg PO HS [facility]; Protocol


   Stop: 19 20:59


   Last Admin: 18 21:05 Dose:  5 mg


Oxybutynin Chloride (Ditropan)  10 mg PO DAILY [facility]


   Stop: 19 08:59


   Last Admin: 18 10:16 Dose:  10 mg


Pantoprazole Sodium (Protonix)  40 mg PO DAILY [facility]


   Stop: 19 08:59


   Last Admin: 18 10:17 Dose:  40 mg


Rivaroxaban (Xarelto)  10 mg PO DAILY [facility]


   Stop: 19 08:59


   Last Admin: 18 10:17 Dose:  10 mg


Tamsulosin HCl (Flomax)  0.4 mg PO HS [facility]


   Stop: 19 20:59


   Last Admin: 18 21:05 Dose:  0.4 mg








Lab - Result Diagrams





 18 07:45 





 18 07:45 





 








Kidney fnc gradually improving w/ BUN/CR of 29/1.9


no further N/V/D


Renal US revealed severe B/L hydroneprosis but nondistended bladder


possible ureteral stricture due to scar, fibrosis, mass, calculi?


continue IVF, encourage po intake


Urology consult


f/u electrolytes, cbc








Nutritional Asmnt/Malnutr-PDOC





- Dietary Evaluation


Malnutrition Findings (Please click <Entered> for more info): 








Nutritional Asmnt/Malnutrition                             Start:  18 11:

06


Text:                                                      Status: Complete    

  


Freq:                                                                          

  


Protocol:                                                                      

  


 Document     18 11:07  PATRICIA  (Rec: 18 11:35  PATRICIA  [id number])


 Nutritional Asmnt/Malnutrition


     Patient General Information


      Nutritional Screening                      High Risk


                                                 Consult


      Diagnosis                                  acute renal insufficient,


                                                 urosepsis


      Pertinent Medical Hx/Surgical Hx           HTN, dementia, depression,


                                                 schizophrenia


      Subjective Information                     Consult receved for wound. Pt


                                                 seen lying in bed at time of


                                                 visit, awake, non-verbal. Per


                                                 CNA, pt only consumed liquid


                                                 from breakfast tray today.


                                                 Observed pt was able to eat


                                                 chocolate chip cookie, pt did


                                                 not want the soup.


      Current Diet Order/ Nutrition Support      low sodium


      Pertinent Medications                      nacl 0.9%


      Pertinent Labs                              cl 109, BUN 60, Cr 2.5,


                                                 Glucose 84


                                                  BUN 68, Cr 2.9, glucose


                                                 134


     Nutritional Hx/Data


      Height                                     1.68 m


      Height (Calculated Centimeters)            167.6


      Current Weight (lbs)                       63.957 kg


      Weight (Calculated Kilograms)              64.0


      Weight (Calculated Grams)                  25383.5


      Ideal Body Weight                          142


      Body Mass Index (BMI)                      22.7


     GI Symptoms


      GI Symptoms                                None


      Difficult in:                              None


      Skin Integrity/Comment:                    reddened to left arm, left


                                                 buttocks, LT back big toe


                                                 open wound to left toe, left


                                                 lower leg


                                                 scar to left ankle


                                                 refugio score 11


      Current %PO                                Good (%)


     Estimated Nutritional Goals


      BEE in Kcals:                              Using Current wt


      Calories/Kcals/Kg                          25-30


      Kcals Calculated                           9026-1587


      Protein:                                   Using Current wt


      Protein g/k.7-0.8 monitor renal labs


      Protein Calculated                         45-51


      Fluid: ml                                  per MD


     Nutritional Problem


      2. Problem


       Problem                                   increased nutrition needs


       Etiology                                  increased metabolic demand for


                                                 wound healing


       Signs/Symptoms:                           open wound


      1. Problem


       Problem                                   altered nutrition related labs


       Etiology                                  renal dysfunction


       Signs/Symptoms:                           BUN 60, Cr 2.5


     Malnutrition Alert


      Is there a minimum of two criteria         No


       selected?                                 


       Query Text:Check all the applicable       


       criteria. A minimum of two criteria are   


       recommended for diagnosis of either       


       severe or non-severe malnutrition.        


     Malnutrition Related to Morbid Obesity


      Malnutrition related to morbid obesity     No


     Intervention/Recommendation


      Comments                                   1. Continue with low sodium


                                                 diet as ordered. Consider


                                                 adding supplements if PO


                                                 intake not meeting nutritional


                                                 needs. Nurses to assist pt


                                                 with all meals.


                                                 2. Add Manuel BID fot wound


                                                 healing.


                                                 3. Monitor renal labs.


                                                 Consider to limit protein


                                                 intake if BUN/Cr continue


                                                 elevated when PO intake >75%.


                                                 4. Monitor PO intake, wt, labs


                                                 and skin integrity


                                                 5. F/U as high risk in 2-3


                                                 days, -, PO check 


     Expected Outcomes/Goals


      Expected Outcomes/Goals                    1. PO intake to meet at least


                                                 75% of nutritional needs.


                                                 2. Wt stability, skin to


                                                 remain intact, labs to


                                                 approach WNL.

## 2018-11-24 NOTE — DIAGNOSTIC IMAGING REPORT
Exam: Chest x-ray



HISTORY preop



Findings:



Frontal examination of chest upright reviewed the study demonstrates

cardiomegaly.  No acute pulmonic demonstrates an noted the right basilar

atelectasis appreciated.  The costophrenic angles are clear.  Bony

thorax intact.



IMPRESSION: right basilar atelectasis



Cardiomegaly.

## 2018-11-25 LAB
ALBUMIN SERPL-MCNC: 2.8 GM/DL (ref 4.2–5.5)
ALBUMIN/GLOB SERPL: 0.9 {RATIO} (ref 1–1.8)
ALP SERPL-CCNC: 25 U/L (ref 34–104)
ALT SERPL-CCNC: 10 U/L (ref 7–52)
ANION GAP SERPL CALC-SCNC: 11.8 MMOL/L (ref 7–16)
APPEARANCE UR: (no result)
AST SERPL-CCNC: 18 U/L (ref 13–39)
BACTERIA #/AREA URNS HPF: (no result) /HPF
BASOPHILS # BLD AUTO: 0 TH/CUMM (ref 0–0.2)
BASOPHILS NFR BLD AUTO: 0.3 % (ref 0–2)
BILIRUB SERPL-MCNC: 0.4 MG/DL (ref 0.3–1)
BUN SERPL-MCNC: 22 MG/DL (ref 7–25)
CALCIUM SERPL-MCNC: 8.7 MG/DL (ref 8.6–10.3)
CHLORIDE SERPL-SCNC: 109 MEQ/L (ref 98–107)
CHOLEST SERPL-MCNC: 120 MG/DL (ref ?–200)
CO2 SERPL-SCNC: 21.2 MEQ/L (ref 21–31)
COLOR UR: YELLOW
CREAT SERPL-MCNC: 1.6 MG/DL (ref 0.7–1.3)
EOSINOPHIL # BLD AUTO: 0.5 TH/CMM (ref 0.1–0.4)
EOSINOPHIL NFR BLD AUTO: 3.4 % (ref 0–5)
EPI CELLS URNS QL MICRO: (no result) /LPF
ERYTHROCYTE [DISTWIDTH] IN BLOOD BY AUTOMATED COUNT: 12 % (ref 11.5–20)
GLOBULIN SER-MCNC: 3.1 GM/DL
GLUCOSE SERPL-MCNC: 89 MG/DL (ref 70–105)
HCT VFR BLD CALC: 30.3 % (ref 41–60)
HDLC SERPL-MCNC: 25 MG/DL (ref 23–92)
HGB BLD-MCNC: 10.5 GM/DL (ref 12–16)
INR PPP: 1.07 (ref 0.5–1.4)
LYMPHOCYTE AB SER FC-ACNC: 1.5 TH/CMM (ref 1.5–3)
LYMPHOCYTES NFR BLD AUTO: 11.2 % (ref 20–50)
MCH RBC QN AUTO: 33.5 PG (ref 27–31)
MCHC RBC AUTO-ENTMCNC: 34.5 PG (ref 28–36)
MCV RBC AUTO: 97.2 FL (ref 80–99)
MONOCYTES # BLD AUTO: 1.1 TH/CMM (ref 0.3–1)
MONOCYTES NFR BLD AUTO: 8 % (ref 2–10)
NEUTROPHILS # BLD: 10.3 TH/CMM (ref 1.8–8)
NEUTROPHILS NFR BLD AUTO: 77.1 % (ref 40–80)
PLATELET # BLD: 197 TH/CMM (ref 150–400)
PMV BLD AUTO: 7.2 FL
POTASSIUM SERPL-SCNC: 4 MEQ/L (ref 3.5–5.1)
PROTHROMBIN TIME: 11.1 SECONDS (ref 9.5–11.5)
RBC # BLD AUTO: 3.12 MIL/CMM (ref 3.8–5.8)
RBC #/AREA URNS HPF: (no result) /HPF (ref 0–5)
SODIUM SERPL-SCNC: 138 MEQ/L (ref 136–145)
TRIGL SERPL-MCNC: 176 MG/DL (ref ?–150)
WBC # BLD AUTO: 13.4 TH/CMM (ref 4.8–10.8)
WBC #/AREA URNS HPF: (no result) /HPF (ref 0–5)

## 2018-11-25 RX ADMIN — OYSTER SHELL CALCIUM WITH VITAMIN D SCH TAB: 500; 200 TABLET, FILM COATED ORAL at 09:50

## 2018-11-25 RX ADMIN — OYSTER SHELL CALCIUM WITH VITAMIN D SCH TAB: 500; 200 TABLET, FILM COATED ORAL at 17:28

## 2018-11-25 RX ADMIN — PANTOPRAZOLE SODIUM SCH MG: 40 TABLET, DELAYED RELEASE ORAL at 09:50

## 2018-11-25 RX ADMIN — CIPROFLOXACIN SCH MLS/HR: 2 INJECTION, SOLUTION INTRAVENOUS at 09:50

## 2018-11-25 NOTE — GENERAL PROGRESS NOTE
Subjective





- Review of Systems


Service Date: 18


Subjective: 





sleeping, comfortable; no further N/V, diarrhea





Objective





- Results


Result Diagrams: 


 18 05:52





 18 05:52


Recent Labs: 


 Laboratory Last Values











WBC  13.4 Th/cmm (4.8-10.8)  H  18  05:52    


 


RBC  3.12 Mil/cmm (3.80-5.80)  L  18  05:52    


 


Hgb  10.5 gm/dL (12-16)  L  18  05:52    


 


Hct  30.3 % (41.0-60)  L  18  05:52    


 


MCV  97.2 fl (80-99)   18  05:52    


 


MCH  33.5 pg (27.0-31.0)  H  18  05:52    


 


MCHC Differential  34.5 pg (28.0-36.0)   18  05:52    


 


RDW  12.0 % (11.5-20.0)   18  05:52    


 


Plt Count  197 Th/cmm (150-400)   18  05:52    


 


MPV  7.2 fl  18  05:52    


 


Neutrophils %  77.1 % (40.0-80.0)   18  05:52    


 


Lymphocytes %  11.2 % (20.0-50.0)  L  18  05:52    


 


Monocytes %  8.0 % (2.0-10.0)   18  05:52    


 


Eosinophils %  3.4 % (0.0-5.0)   18  05:52    


 


Basophils %  0.3 % (0.0-2.0)   18  05:52    


 


Eos Smear Source  URINE   18  11:55    


 


Eos Smear Total Cells  NONE SEEN  (NONE SEEN)   18  11:55    


 


PT  11.1 SECONDS (9.5-11.5)   18  05:52    


 


INR  1.07  (0.5-1.4)   18  05:52    


 


PTT (Actin FS)  24.8 SECONDS (26.0-38.0)  L  18  05:52    


 


Sodium  138 mEq/L (136-145)   18  05:52    


 


Potassium  4.0 mEq/L (3.5-5.1)   18  05:52    


 


Chloride  109 mEq/L ()  H  18  05:52    


 


Carbon Dioxide  21.2 mEq/L (21.0-31.0)   18  05:52    


 


Anion Gap  11.8  (7.0-16.0)   18  05:52    


 


BUN  22 mg/dL (7-25)   18  05:52    


 


Creatinine  1.6 mg/dL (0.7-1.3)  H  18  05:52    


 


Est GFR ( Amer)  TNP   18  05:52    


 


Est GFR (Non-Af Amer)  TNP   18  05:52    


 


BUN/Creatinine Ratio  13.8   18  05:52    


 


Glucose  89 mg/dL ()   18  05:52    


 


Calcium  8.7 mg/dL (8.6-10.3)   18  05:52    


 


Phosphorus  2.6 mg/dL (2.5-5.0)   18  04:25    


 


Magnesium  1.9 mg/dL (1.9-2.7)   18  07:45    


 


Total Bilirubin  0.4 mg/dL (0.3-1.0)   18  05:52    


 


AST  18 U/L (13-39)   18  05:52    


 


ALT  10 U/L (7-52)   18  05:52    


 


Alkaline Phosphatase  25 U/L ()  L  18  05:52    


 


Creatine Kinase  27 U/L ()  L  18  13:50    


 


Troponin I  0.02 ng/mL (0.01-0.05)   18  13:50    


 


B-Natriuretic Peptide  51.8 pg/mL (5.0-100.0)   18  13:50    


 


Total Protein  5.9 gm/dL (6.0-8.3)  L  18  05:52    


 


Albumin  2.8 gm/dL (4.2-5.5)  L  18  05:52    


 


Globulin  3.1 gm/dL  18  05:52    


 


Albumin/Globulin Ratio  0.9  (1.0-1.8)  L  18  05:52    


 


Triglycerides  176 mg/dL (<150)  H  18  05:52    


 


Cholesterol  120 mg/dL (<200)   18  05:52    


 


LDL Cholesterol Direct  74 mg/dL ()  L  18  05:52    


 


HDL Cholesterol  25 mg/dL (23-92)   18  05:52    


 


Amylase  29 U/L ()   18  13:50    


 


Lipase  63 U/L (11-82)   18  13:50    


 


TSH  4.85 uIU/ml (0.34-5.60)   18  05:52    


 


Urine Source  RANDOM   18  23:30    


 


Urine Color  YELLOW   18  23:30    


 


Urine Clarity  HAZY  (CLEAR)   18  23:30    


 


Urine pH  5.5  (4.6 - 8.0)   18  23:30    


 


Ur Specific Gravity  1.010  (1.005-1.030)   18  23:30    


 


Urine Protein  NEGATIVE mg/dL (NEGATIVE)   18  23:30    


 


Urine Glucose (UA)  NEGATIVE mg/dL (NEGATIVE)   18  23:30    


 


Urine Ketones  NEGATIVE mg/dL (NEGATIVE)   18  23:30    


 


Urine Blood  LARGE  (NEGATIVE)  H  18  23:30    


 


Urine Nitrate  NEGATIVE  (NEGATIVE)   18  23:30    


 


Urine Bilirubin  NEGATIVE  (NEGATIVE)   18  23:30    


 


Urine Urobilinogen  0.2 E.U./dL (0.2 - 1.0)   18  23:30    


 


Ur Leukocyte Esterase  LARGE  (NEGATIVE)  H  18  23:30    


 


Urine RBC  0-2 /hpf (0-5)  H  18  23:30    


 


Urine WBC  6-10 /hpf (0-5)   18  23:30    


 


Ur Epithelial Cells  NONE SEEN /lpf (FEW)   18  23:30    


 


Urine Bacteria  MODERATE /hpf (NONE SEEN)  H  18  23:30    


 


Ur Random Sodium  119 mmol/L  18  11:55    


 


Urine Creatinine  74.0 mg/dl (39.0-259.0)   11/22/18  11:55    


 


Microalb/Creat Ratio  195.2 mg/g creat (0.0-30.0)  H  18  11:55    














- Physical Exam


Vitals and I&O: 


 Vital Signs











Temp  97.7 F   18 07:40


 


Pulse  73   18 09:48


 


Resp  16   18 08:00


 


BP  139/66   18 09:48


 


Pulse Ox  98   18 07:40








 Intake & Output











 18





 18:59 06:59 18:59


 


Intake Total 1100 1000 1000


 


Balance 1100 1000 1000


 


Weight (lbs)  67.222 kg 


 


Intake:   


 


  Intake, IV Amount 1100 1000 1000


 


    Ciprofloxacin 200mg 100  





    Premix  mg In 100   





    ml @ 100 mls/hr IV Q24HR   





    [facility] Rx#:[id number]   


 


    Sodium Chloride 0.9% 1, 1000 1000 1000





    000 ml @ 100 mls/hr IV .   





    Q10H [facility] Rx#:[id number]   


 


Other:   


 


  # Voids  2 


 


  Weight Source  Bedscale 











Active Medications: 


Current Medications





Acetaminophen (Tylenol)  650 mg PO Q6H PRN


   PRN Reason: fever/pain


   Stop: 19 13:42


Atorvastatin Calcium (Lipitor)  20 mg PO DAILY [facility]; Protocol


   Stop: 19 08:59


   Last Admin: 18 09:49 Dose:  20 mg


Calcium/Vitamin D (Oscal W/Vitamin D)  1 tab PO BID [facility]


   Stop: 19 16:59


   Last Admin: 18 09:50 Dose:  1 tab


Cholecalciferol (Vitamin D3)  1,000 iu PO DAILY [facility]


   Stop: 19 13:44


   Last Admin: 18 09:50 Dose:  1,000 iu


Divalproex Sodium (Depakote Er)  500 mg PO TID [facility]; Protocol


   Stop: 19 13:59


   Last Admin: 18 10:01 Dose:  500 mg


Docusate Sodium (Colace)  100 mg PO DAILY [facility]


   Stop: 19 13:44


   Last Admin: 18 09:50 Dose:  100 mg


Escitalopram Oxalate (Lexapro)  10 mg PO DAILY [facility]; Protocol


   Stop: 19 13:44


   Last Admin: 18 09:50 Dose:  10 mg


Finasteride (Proscar)  5 mg PO [facility]; Protocol


   Stop: 19 20:59


   Last Admin: 18 20:22 Dose:  5 mg


Sodium Chloride (Nacl 0.9%)  1,000 mls @ 100 mls/hr IV .Q10H [facility]


   Stop: 19 17:44


   Last Admin: 18 10:00 Dose:  100 mls/hr


Ciprofloxacin (Cipro 200mg Premix Pb)  200 mg in 100 mls @ 100 mls/hr IV Q24HR 

[facility]


   Stop: 19 09:44


   Last Admin: 18 09:50 Dose:  100 mls/hr


Vancomycin HCl 1 gm/ Sodium (Chloride)  250 mls @ 166.667 mls/hr IV Q24H [facility]


   Stop: 19 13:44


   Last Admin: 18 17:07 Dose:  166.667 mls/hr


Magnesium Hydroxide (Milk Of Magnesia)  30 ml PO DAILY PRN


   PRN Reason: Constipation


   Stop: 19 13:45


Metoprolol Tartrate (Lopressor)  25 mg PO BID [facility]


   Stop: 19 16:59


   Last Admin: 18 09:48 Dose:  25 mg


Olanzapine (Zyprexa)  5 mg PO [facility]; Protocol


   Stop: 19 20:59


   Last Admin: 18 20:22 Dose:  5 mg


Oxybutynin Chloride (Ditropan)  10 mg PO DAILY [facility]


   Stop: 19 08:59


   Last Admin: 18 09:50 Dose:  10 mg


Pantoprazole Sodium (Protonix)  40 mg PO DAILY [facility]


   Stop: 19 08:59


   Last Admin: 18 09:50 Dose:  40 mg


Tamsulosin HCl (Flomax)  0.4 mg PO BID [facility]


   Stop: 19 16:59


   Last Admin: 18 09:50 Dose:  0.4 mg








General: Alert, No acute distress


HEENT: Atraumatic, Mucous membr. moist/pink


Neck: Supple, +2 carotid pulse wo bruit


Cardiovascular: Regular rate, Normal S1, Normal S2


Lungs: Clear to auscultation


Abdomen: Bowel sounds, Soft


Extremities: no Edema


Neurological: Sensation intact


Skin: no Significant lesion


Psych/Mental Status: Mood NL





- Procedures


Procedures: 


 Procedures











Procedure Code Date


 


C & S-INTEGUMENT 91.63 95


 


CULTURE OTHR SPECIMN AEROBIC 45173 95


 


DX ULTRASOUND-VASCULAR 88.77 95


 


ELECTROCARDIOGRAPH MONIT 89.54 95


 


INJECT ANTIBIOTIC 99.21 10/09/95


 


INJECT ANTICOAGULANT 99.19 95


 


MICROBE SUSCEPTIBLE DISK 13377 95


 


OTHER C.A.T. SCAN 88.38 95


 


URINARY SYSTEM X-RAY NEC 87.79 95


 


WHIRLPOOL THERAPY 07778 95


 


WHIRLPOOL TREATMENT 93.32 95














Assessment/Plan





- Assessment


Assessment: 





DEVIN


Acute gastroenteritis


Dehydration


Severe B/L hydronephrosis


Severe ID


Paranoid schizo


Ess Htn


BPH


Depression


LE DVT


Epilepsy


 








- Plan


Plan: 


Lab - Result Diagrams





 18 04:25 





 18 04:25 





Current Medications





Acetaminophen (Tylenol)  650 mg PO Q6H PRN


   PRN Reason: fever/pain


   Stop: 19 13:42


Calcium/Vitamin D (Oscal W/Vitamin D)  1 tab PO BID [facility]


   Stop: 19 16:59


   Last Admin: 18 10:17 Dose:  1 tab


Cholecalciferol (Vitamin D3)  1,000 iu PO DAILY [facility]


   Stop: 19 13:44


   Last Admin: 18 10:17 Dose:  1,000 iu


Divalproex Sodium (Depakote Er)  500 mg PO TID [facility]; Protocol


   Stop: 19 13:59


   Last Admin: 18 14:21 Dose:  500 mg


Docusate Sodium (Colace)  100 mg PO DAILY [facility]


   Stop: 19 13:44


   Last Admin: 18 10:17 Dose:  100 mg


Escitalopram Oxalate (Lexapro)  10 mg PO DAILY [facility]; Protocol


   Stop: 19 13:44


   Last Admin: 18 10:16 Dose:  10 mg


Finasteride (Proscar)  5 mg PO HS [facility]; Protocol


   Stop: 19 20:59


   Last Admin: 18 21:05 Dose:  5 mg


Sodium Chloride (Nacl 0.9%)  1,000 mls @ 100 mls/hr IV .Q10H [facility]


   Stop: 19 17:44


   Last Admin: 18 03:31 Dose:  100 mls/hr


Ceftriaxone Sodium 1 gm/ (Sodium Chloride)  50 mls @ 100 mls/hr IV Q24HR [facility]


   Stop: 19 16:59


   Last Admin: 18 16:19 Dose:  100 mls/hr


Ciprofloxacin (Cipro 200mg Premix Pb)  200 mg in 100 mls @ 100 mls/hr IV Q24HR 

[facility]


   Stop: 19 09:44


   Last Admin: 18 10:21 Dose:  100 mls/hr


Magnesium Hydroxide (Milk Of Magnesia)  30 ml PO DAILY PRN


   PRN Reason: Constipation


   Stop: 19 13:45


Metoprolol Tartrate (Lopressor)  25 mg PO BID [facility]


   Stop: 19 16:59


   Last Admin: 18 10:16 Dose:  25 mg


Olanzapine (Zyprexa)  5 mg PO HS [facility]; Protocol


   Stop: 19 20:59


   Last Admin: 18 21:05 Dose:  5 mg


Oxybutynin Chloride (Ditropan)  10 mg PO DAILY [facility]


   Stop: 19 08:59


   Last Admin: 18 10:16 Dose:  10 mg


Pantoprazole Sodium (Protonix)  40 mg PO DAILY [facility]


   Stop: 19 08:59


   Last Admin: 18 10:17 Dose:  40 mg


Rivaroxaban (Xarelto)  10 mg PO DAILY [facility]


   Stop: 19 08:59


   Last Admin: 18 10:17 Dose:  10 mg


Tamsulosin HCl (Flomax)  0.4 mg PO HS [facility]


   Stop: 19 20:59


   Last Admin: 18 21:05 Dose:  0.4 mg





Lab - Result Diagrams





 18 05:52 





 18 05:52 

















 








Kidney fnc gradually improving w/ BUN/CR of 22/1.6


no further N/V/D


Renal US revealed severe B/L hydroneprosis but nondistended bladder


possible ureteral stricture due to scar, fibrosis, mass, calculi?


continue IVF, encourage po intake


Urology consult


f/u electrolytes, cbc


WBC still elevated @ 13.4








Nutritional Asmnt/Malnutr-PDOC





- Dietary Evaluation


Malnutrition Findings (Please click <Entered> for more info): 








Nutritional Asmnt/Malnutrition                             Start:  18 11:

06


Text:                                                      Status: Complete    

  


Freq:                                                                          

  


Protocol:                                                                      

  


 Document     18 11:07  AICHAG  (Rec: 18 11:35  LCHENG  [id number])


 Nutritional Asmnt/Malnutrition


     Patient General Information


      Nutritional Screening                      High Risk


                                                 Consult


      Diagnosis                                  acute renal insufficient,


                                                 urosepsis


      Pertinent Medical Hx/Surgical Hx           HTN, dementia, depression,


                                                 schizophrenia


      Subjective Information                     Consult receved for wound. Pt


                                                 seen lying in bed at time of


                                                 visit, awake, non-verbal. Per


                                                 CNA, pt only consumed liquid


                                                 from breakfast tray today.


                                                 Observed pt was able to eat


                                                 chocolate chip cookie, pt did


                                                 not want the soup.


      Current Diet Order/ Nutrition Support      low sodium


      Pertinent Medications                      nacl 0.9%


      Pertinent Labs                              cl 109, BUN 60, Cr 2.5,


                                                 Glucose 84


                                                  BUN 68, Cr 2.9, glucose


                                                 134


     Nutritional Hx/Data


      Height                                     1.68 m


      Height (Calculated Centimeters)            167.6


      Current Weight (lbs)                       63.957 kg


      Weight (Calculated Kilograms)              64.0


      Weight (Calculated Grams)                  22233.5


      Ideal Body Weight                          142


      Body Mass Index (BMI)                      22.7


     GI Symptoms


      GI Symptoms                                None


      Difficult in:                              None


      Skin Integrity/Comment:                    reddened to left arm, left


                                                 buttocks, LT back big toe


                                                 open wound to left toe, left


                                                 lower leg


                                                 scar to left ankle


                                                 refugio score 11


      Current %PO                                Good (%)


     Estimated Nutritional Goals


      BEE in Kcals:                              Using Current wt


      Calories/Kcals/Kg                          25-30


      Kcals Calculated                           [id number]


      Protein:                                   Using Current wt


      Protein g/k.7-0.8 monitor renal labs


      Protein Calculated                         45-51


      Fluid: ml                                  per MD


     Nutritional Problem


      2. Problem


       Problem                                   increased nutrition needs


       Etiology                                  increased metabolic demand for


                                                 wound healing


       Signs/Symptoms:                           open wound


      1. Problem


       Problem                                   altered nutrition related labs


       Etiology                                  renal dysfunction


       Signs/Symptoms:                           BUN 60, Cr 2.5


     Malnutrition Alert


      Is there a minimum of two criteria         No


       selected?                                 


       Query Text:Check all the applicable       


       criteria. A minimum of two criteria are   


       recommended for diagnosis of either       


       severe or non-severe malnutrition.        


     Malnutrition Related to Morbid Obesity


      Malnutrition related to morbid obesity     No


     Intervention/Recommendation


      Comments                                   1. Continue with low sodium


                                                 diet as ordered. Consider


                                                 adding supplements if PO


                                                 intake not meeting nutritional


                                                 needs. Nurses to assist pt


                                                 with all meals.


                                                 2. Add Manuel BID fot wound


                                                 healing.


                                                 3. Monitor renal labs.


                                                 Consider to limit protein


                                                 intake if BUN/Cr continue


                                                 elevated when PO intake >75%.


                                                 4. Monitor PO intake, wt, labs


                                                 and skin integrity


                                                 5. F/U as high risk in 2-3


                                                 days, -, PO check 


     Expected Outcomes/Goals


      Expected Outcomes/Goals                    1. PO intake to meet at least


                                                 75% of nutritional needs.


                                                 2. Wt stability, skin to


                                                 remain intact, labs to


                                                 approach WNL.

## 2018-11-26 LAB
ANION GAP SERPL CALC-SCNC: 10.5 MMOL/L (ref 7–16)
BASOPHILS # BLD AUTO: 0.2 TH/CUMM (ref 0–0.2)
BASOPHILS NFR BLD AUTO: 1.5 % (ref 0–2)
BUN SERPL-MCNC: 22 MG/DL (ref 7–25)
CALCIUM SERPL-MCNC: 8.7 MG/DL (ref 8.6–10.3)
CHLORIDE SERPL-SCNC: 112 MEQ/L (ref 98–107)
CO2 SERPL-SCNC: 22.4 MEQ/L (ref 21–31)
CREAT SERPL-MCNC: 1.7 MG/DL (ref 0.7–1.3)
EOSINOPHIL # BLD AUTO: 0.6 TH/CMM (ref 0.1–0.4)
EOSINOPHIL NFR BLD AUTO: 5.1 % (ref 0–5)
ERYTHROCYTE [DISTWIDTH] IN BLOOD BY AUTOMATED COUNT: 12.3 % (ref 11.5–20)
GLUCOSE SERPL-MCNC: 90 MG/DL (ref 70–105)
HCT VFR BLD CALC: 28.9 % (ref 41–60)
HGB BLD-MCNC: 9.7 GM/DL (ref 12–16)
LYMPHOCYTE AB SER FC-ACNC: 1.6 TH/CMM (ref 1.5–3)
LYMPHOCYTES NFR BLD AUTO: 13.9 % (ref 20–50)
MAGNESIUM SERPL-MCNC: 1.7 MG/DL (ref 1.9–2.7)
MCH RBC QN AUTO: 32.9 PG (ref 27–31)
MCHC RBC AUTO-ENTMCNC: 33.7 PG (ref 28–36)
MCV RBC AUTO: 97.7 FL (ref 80–99)
MONOCYTES # BLD AUTO: 0.9 TH/CMM (ref 0.3–1)
MONOCYTES NFR BLD AUTO: 7.8 % (ref 2–10)
NEUTROPHILS # BLD: 8.1 TH/CMM (ref 1.8–8)
NEUTROPHILS NFR BLD AUTO: 71.7 % (ref 40–80)
PLATELET # BLD: 201 TH/CMM (ref 150–400)
PMV BLD AUTO: 7 FL
POTASSIUM SERPL-SCNC: 3.9 MEQ/L (ref 3.5–5.1)
RBC # BLD AUTO: 2.96 MIL/CMM (ref 3.8–5.8)
SODIUM SERPL-SCNC: 141 MEQ/L (ref 136–145)
WBC # BLD AUTO: 11.4 TH/CMM (ref 4.8–10.8)

## 2018-11-26 RX ADMIN — OYSTER SHELL CALCIUM WITH VITAMIN D SCH TAB: 500; 200 TABLET, FILM COATED ORAL at 08:57

## 2018-11-26 RX ADMIN — CIPROFLOXACIN SCH MLS/HR: 2 INJECTION, SOLUTION INTRAVENOUS at 08:52

## 2018-11-26 RX ADMIN — PANTOPRAZOLE SODIUM SCH MG: 40 TABLET, DELAYED RELEASE ORAL at 08:57

## 2018-11-26 RX ADMIN — CLINDAMYCIN PHOSPHATE SCH MLS/HR: 150 INJECTION, SOLUTION, CONCENTRATE INTRAVENOUS at 13:27

## 2018-11-26 RX ADMIN — CLINDAMYCIN PHOSPHATE SCH MLS/HR: 150 INJECTION, SOLUTION, CONCENTRATE INTRAVENOUS at 19:57

## 2018-11-26 RX ADMIN — OYSTER SHELL CALCIUM WITH VITAMIN D SCH TAB: 500; 200 TABLET, FILM COATED ORAL at 18:05

## 2018-11-26 NOTE — GENERAL PROGRESS NOTE
Subjective





- Review of Systems


Service Date: 18


Subjective: 





sleeping, comfortable; no further N/V, diarrhea





Objective





- Results


Result Diagrams: 


 18 04:20





 18 04:20


Recent Labs: 


 Laboratory Last Values











WBC  11.4 Th/cmm (4.8-10.8)  H  18  04:20    


 


RBC  2.96 Mil/cmm (3.80-5.80)  L  18  04:20    


 


Hgb  9.7 gm/dL (12-16)  L  18  04:20    


 


Hct  28.9 % (41.0-60)  L  18  04:20    


 


MCV  97.7 fl (80-99)   18  04:20    


 


MCH  32.9 pg (27.0-31.0)  H  18  04:20    


 


MCHC Differential  33.7 pg (28.0-36.0)   18  04:20    


 


RDW  12.3 % (11.5-20.0)   18  04:20    


 


Plt Count  201 Th/cmm (150-400)   18  04:20    


 


MPV  7.0 fl  18  04:20    


 


Neutrophils %  71.7 % (40.0-80.0)   18  04:20    


 


Lymphocytes %  13.9 % (20.0-50.0)  L  18  04:20    


 


Monocytes %  7.8 % (2.0-10.0)   18  04:20    


 


Eosinophils %  5.1 % (0.0-5.0)  H  18  04:20    


 


Basophils %  1.5 % (0.0-2.0)   18  04:20    


 


Eos Smear Source  URINE   18  11:55    


 


Eos Smear Total Cells  NONE SEEN  (NONE SEEN)   18  11:55    


 


PT  11.1 SECONDS (9.5-11.5)   18  05:52    


 


INR  1.07  (0.5-1.4)   18  05:52    


 


PTT (Actin FS)  24.8 SECONDS (26.0-38.0)  L  18  05:52    


 


Sodium  141 mEq/L (136-145)   18  04:20    


 


Potassium  3.9 mEq/L (3.5-5.1)   18  04:20    


 


Chloride  112 mEq/L ()  H  18  04:20    


 


Carbon Dioxide  22.4 mEq/L (21.0-31.0)   18  04:20    


 


Anion Gap  10.5  (7.0-16.0)   18  04:20    


 


BUN  22 mg/dL (7-25)   18  04:20    


 


Creatinine  1.7 mg/dL (0.7-1.3)  H  18  04:20    


 


Est GFR ( Amer)  TNP   18  04:20    


 


Est GFR (Non-Af Amer)  TNP   18  04:20    


 


BUN/Creatinine Ratio  12.9   18  04:20    


 


Glucose  90 mg/dL ()   18  04:20    


 


Calcium  8.7 mg/dL (8.6-10.3)   18  04:20    


 


Phosphorus  2.6 mg/dL (2.5-5.0)   18  04:25    


 


Magnesium  1.7 mg/dL (1.9-2.7)  L  18  04:20    


 


Total Bilirubin  0.4 mg/dL (0.3-1.0)   18  05:52    


 


AST  18 U/L (13-39)   18  05:52    


 


ALT  10 U/L (7-52)   18  05:52    


 


Alkaline Phosphatase  25 U/L ()  L  18  05:52    


 


Creatine Kinase  27 U/L ()  L  18  13:50    


 


Troponin I  0.02 ng/mL (0.01-0.05)   18  13:50    


 


B-Natriuretic Peptide  51.8 pg/mL (5.0-100.0)   18  13:50    


 


Total Protein  5.9 gm/dL (6.0-8.3)  L  18  05:52    


 


Albumin  2.8 gm/dL (4.2-5.5)  L  18  05:52    


 


Globulin  3.1 gm/dL  18  05:52    


 


Albumin/Globulin Ratio  0.9  (1.0-1.8)  L  18  05:52    


 


Triglycerides  176 mg/dL (<150)  H  18  05:52    


 


Cholesterol  120 mg/dL (<200)   18  05:52    


 


LDL Cholesterol Direct  74 mg/dL ()  L  18  05:52    


 


HDL Cholesterol  25 mg/dL (23-92)   18  05:52    


 


Amylase  29 U/L ()   18  13:50    


 


Lipase  63 U/L (11-82)   18  13:50    


 


TSH  4.85 uIU/ml (0.34-5.60)   18  05:52    


 


Urine Source  RANDOM   18  23:30    


 


Urine Color  YELLOW   18  23:30    


 


Urine Clarity  HAZY  (CLEAR)   18  23:30    


 


Urine pH  5.5  (4.6 - 8.0)   18  23:30    


 


Ur Specific Gravity  1.010  (1.005-1.030)   18  23:30    


 


Urine Protein  NEGATIVE mg/dL (NEGATIVE)   18  23:30    


 


Urine Glucose (UA)  NEGATIVE mg/dL (NEGATIVE)   18  23:30    


 


Urine Ketones  NEGATIVE mg/dL (NEGATIVE)   18  23:30    


 


Urine Blood  LARGE  (NEGATIVE)  H  18  23:30    


 


Urine Nitrate  NEGATIVE  (NEGATIVE)   18  23:30    


 


Urine Bilirubin  NEGATIVE  (NEGATIVE)   18  23:30    


 


Urine Urobilinogen  0.2 E.U./dL (0.2 - 1.0)   18  23:30    


 


Ur Leukocyte Esterase  LARGE  (NEGATIVE)  H  18  23:30    


 


Urine RBC  0-2 /hpf (0-5)  H  18  23:30    


 


Urine WBC  6-10 /hpf (0-5)   18  23:30    


 


Ur Epithelial Cells  NONE SEEN /lpf (FEW)   18  23:30    


 


Urine Bacteria  MODERATE /hpf (NONE SEEN)  H  18  23:30    


 


Ur Random Sodium  119 mmol/L  18  11:55    


 


Urine Creatinine  74.0 mg/dl (39.0-259.0)   18  11:55    


 


Microalb/Creat Ratio  195.2 mg/g creat (0.0-30.0)  H  18  11:55    














- Physical Exam


Vitals and I&O: 


 Vital Signs











Temp  97.3 F   18 11:48


 


Pulse  68   18 11:48


 


Resp  18   18 11:48


 


BP  112/63   18 11:48


 


Pulse Ox  100   18 11:48








 Intake & Output











 18





 18:59 06:59 18:59


 


Intake Total 1100  


 


Balance 1100  


 


Weight (lbs)   66.769 kg


 


Intake:   


 


  Intake, IV Amount 1100  


 


    Ciprofloxacin 200mg 100  





    Premix  mg In 100   





    ml @ 100 mls/hr IV Q24HR   





    [facility] Rx#:[id number]   


 


    Sodium Chloride 0.9% 1, 1000  





    000 ml @ 100 mls/hr IV .   





    Q10H [facility] Rx#:[id number]   


 


Other:   


 


  # Voids   2


 


  Weight Source   Bedscale











Active Medications: 


Current Medications





Acetaminophen (Tylenol)  650 mg PO Q6H PRN


   PRN Reason: fever/pain


   Stop: 19 13:42


Atorvastatin Calcium (Lipitor)  20 mg PO DAILY [facility]; Protocol


   Stop: 19 08:59


   Last Admin: 18 08:56 Dose:  20 mg


Calcium/Vitamin D (Oscal W/Vitamin D)  1 tab PO BID [facility]


   Stop: 19 16:59


   Last Admin: 18 08:57 Dose:  1 tab


Cholecalciferol (Vitamin D3)  1,000 iu PO DAILY [facility]


   Stop: 19 13:44


   Last Admin: 18 08:57 Dose:  1,000 iu


Diltiazem HCl (Cardizem)  5 mg IVP Q4H PRN


   PRN Reason: HR MORE THAN 120


   Stop: 19 04:44


Divalproex Sodium (Depakote Er)  500 mg PO TID [facility]; Protocol


   Stop: 19 13:59


   Last Admin: 18 08:57 Dose:  500 mg


Docusate Sodium (Colace)  100 mg PO DAILY [facility]


   Stop: 19 13:44


   Last Admin: 18 08:57 Dose:  100 mg


Escitalopram Oxalate (Lexapro)  10 mg PO DAILY [facility]; Protocol


   Stop: 19 13:44


   Last Admin: 18 08:57 Dose:  10 mg


Finasteride (Proscar)  5 mg PO [facility]; Protocol


   Stop: 19 20:59


   Last Admin: 18 20:58 Dose:  Not Given


Sodium Chloride (Nacl 0.9%)  1,000 mls @ 100 mls/hr IV .Q10H [facility]


   Stop: 19 17:44


   Last Admin: 18 10:00 Dose:  100 mls/hr


Ciprofloxacin (Cipro 200mg Premix Pb)  200 mg in 100 mls @ 100 mls/hr IV Q24HR 

[facility]


   Stop: 19 09:44


   Last Admin: 18 08:52 Dose:  100 mls/hr


Clindamycin Phosphate 300 mg/ (Sodium Chloride)  52 mls @ 100 mls/hr IV Q8H [facility]


   Stop: 19 09:59


   Last Admin: 18 13:27 Dose:  100 mls/hr


Magnesium Hydroxide (Milk Of Magnesia)  30 ml PO DAILY PRN


   PRN Reason: Constipation


   Stop: 19 13:45


Metoprolol Tartrate (Lopressor)  25 mg PO BID [facility]


   Stop: 19 16:59


   Last Admin: 18 08:58 Dose:  25 mg


Olanzapine (Zyprexa)  5 mg PO [facility]; Protocol


   Stop: 19 20:59


   Last Admin: 18 20:57 Dose:  Not Given


Oxybutynin Chloride (Ditropan)  10 mg PO DAILY [facility]


   Stop: 19 08:59


   Last Admin: 18 08:57 Dose:  10 mg


Pantoprazole Sodium (Protonix)  40 mg PO DAILY [facility]


   Stop: 19 08:59


   Last Admin: 18 08:57 Dose:  40 mg


Tamsulosin HCl (Flomax)  0.4 mg PO BID [facility]


   Stop: 19 16:59


   Last Admin: 18 08:57 Dose:  0.4 mg








General: Alert, No acute distress


HEENT: Atraumatic, Mucous membr. moist/pink


Neck: Supple, +2 carotid pulse wo bruit


Cardiovascular: Regular rate, Normal S1, Normal S2


Lungs: Clear to auscultation


Abdomen: Bowel sounds, Soft


Extremities: no Edema


Neurological: Sensation intact


Skin: no Significant lesion


Psych/Mental Status: Mood NL





- Procedures


Procedures: 


 Procedures











Procedure Code Date


 


C & S-INTEGUMENT 91.63 95


 


CULTURE OTHR SPECIMN AEROBIC 01668 95


 


DX ULTRASOUND-VASCULAR 88.77 95


 


ELECTROCARDIOGRAPH MONIT 89.54 95


 


INJECT ANTIBIOTIC 99.21 10/09/95


 


INJECT ANTICOAGULANT 99.19 95


 


MICROBE SUSCEPTIBLE DISK 38178 95


 


OTHER C.A.T. SCAN 88.38 95


 


URINARY SYSTEM X-RAY NEC 87.79 95


 


WHIRLPOOL THERAPY 64694 95


 


WHIRLPOOL TREATMENT 93.32 95














Assessment/Plan





- Assessment


Assessment: 





DEVIN


Acute gastroenteritis


Dehydration


Severe B/L hydronephrosis


Severe ID


Paranoid schizo


Ess Htn


BPH


Depression


LE DVT


Epilepsy


 








- Plan


Plan: 


Lab - Result Diagrams





 18 04:25 





 18 04:25 





Current Medications





Acetaminophen (Tylenol)  650 mg PO Q6H PRN


   PRN Reason: fever/pain


   Stop: 19 13:42


Calcium/Vitamin D (Oscal W/Vitamin D)  1 tab PO BID [facility]


   Stop: 19 16:59


   Last Admin: 18 10:17 Dose:  1 tab


Cholecalciferol (Vitamin D3)  1,000 iu PO DAILY [facility]


   Stop: 19 13:44


   Last Admin: 18 10:17 Dose:  1,000 iu


Divalproex Sodium (Depakote Er)  500 mg PO TID [facility]; Protocol


   Stop: 19 13:59


   Last Admin: 18 14:21 Dose:  500 mg


Docusate Sodium (Colace)  100 mg PO DAILY [facility]


   Stop: 19 13:44


   Last Admin: 18 10:17 Dose:  100 mg


Escitalopram Oxalate (Lexapro)  10 mg PO DAILY [facility]; Protocol


   Stop: 19 13:44


   Last Admin: 18 10:16 Dose:  10 mg


Finasteride (Proscar)  5 mg PO HS [facility]; Protocol


   Stop: 19 20:59


   Last Admin: 18 21:05 Dose:  5 mg


Sodium Chloride (Nacl 0.9%)  1,000 mls @ 100 mls/hr IV .Q10H [facility]


   Stop: 19 17:44


   Last Admin: 18 03:31 Dose:  100 mls/hr


Ceftriaxone Sodium 1 gm/ (Sodium Chloride)  50 mls @ 100 mls/hr IV Q24HR [facility]


   Stop: 19 16:59


   Last Admin: 18 16:19 Dose:  100 mls/hr


Ciprofloxacin (Cipro 200mg Premix Pb)  200 mg in 100 mls @ 100 mls/hr IV Q24HR 

[facility]


   Stop: 19 09:44


   Last Admin: 18 10:21 Dose:  100 mls/hr


Magnesium Hydroxide (Milk Of Magnesia)  30 ml PO DAILY PRN


   PRN Reason: Constipation


   Stop: 19 13:45


Metoprolol Tartrate (Lopressor)  25 mg PO BID [facility]


   Stop: 19 16:59


   Last Admin: 18 10:16 Dose:  25 mg


Olanzapine (Zyprexa)  5 mg PO HS [facility]; Protocol


   Stop: 19 20:59


   Last Admin: 18 21:05 Dose:  5 mg


Oxybutynin Chloride (Ditropan)  10 mg PO DAILY [facility]


   Stop: 19 08:59


   Last Admin: 18 10:16 Dose:  10 mg


Pantoprazole Sodium (Protonix)  40 mg PO DAILY [facility]


   Stop: 19 08:59


   Last Admin: 18 10:17 Dose:  40 mg


Rivaroxaban (Xarelto)  10 mg PO DAILY [facility]


   Stop: 19 08:59


   Last Admin: 18 10:17 Dose:  10 mg


Tamsulosin HCl (Flomax)  0.4 mg PO HS [facility]


   Stop: 19 20:59


   Last Admin: 18 21:05 Dose:  0.4 mg








Lab - Result Diagrams





 18 04:20 





 18 04:20 





 




















 








Kidney fnc gradually improving w/ BUN/CR of 22/1.7


no further N/V/D


Renal US revealed severe B/L hydroneprosis but nondistended bladder


possible ureteral stricture due to scar, fibrosis, mass, calculi?


continue IVF, encourage po intake


Urology consult


f/u electrolytes, cbc


WBC still elevated @ 11.4


possible surgery in am








Nutritional Asmnt/Malnutr-PDOC





- Dietary Evaluation


Malnutrition Findings (Please click <Entered> for more info): 








Nutritional Asmnt/Malnutrition                             Start:  18 11:

06


Text:                                                      Status: Complete    

  


Freq:                                                                          

  


Protocol:                                                                      

  


 Document     18 11:07  PATRICIA  (Rec: 18 11:35  HEN  [id number])


 Nutritional Asmnt/Malnutrition


     Patient General Information


      Nutritional Screening                      High Risk


                                                 Consult


      Diagnosis                                  acute renal insufficient,


                                                 urosepsis


      Pertinent Medical Hx/Surgical Hx           HTN, dementia, depression,


                                                 schizophrenia


      Subjective Information                     Consult receved for wound. Pt


                                                 seen lying in bed at time of


                                                 visit, awake, non-verbal. Per


                                                 CNA, pt only consumed liquid


                                                 from breakfast tray today.


                                                 Observed pt was able to eat


                                                 chocolate chip cookie, pt did


                                                 not want the soup.


      Current Diet Order/ Nutrition Support      low sodium


      Pertinent Medications                      nacl 0.9%


      Pertinent Labs                              cl 109, BUN 60, Cr 2.5,


                                                 Glucose 84


                                                  BUN 68, Cr 2.9, glucose


                                                 134


     Nutritional Hx/Data


      Height                                     1.68 m


      Height (Calculated Centimeters)            167.6


      Current Weight (lbs)                       63.957 kg


      Weight (Calculated Kilograms)              64.0


      Weight (Calculated Grams)                  56805.5


      Ideal Body Weight                          142


      Body Mass Index (BMI)                      22.7


     GI Symptoms


      GI Symptoms                                None


      Difficult in:                              None


      Skin Integrity/Comment:                    reddened to left arm, left


                                                 buttocks, LT back big toe


                                                 open wound to left toe, left


                                                 lower leg


                                                 scar to left ankle


                                                 refugio score 11


      Current %PO                                Good (%)


     Estimated Nutritional Goals


      BEE in Kcals:                              Using Current wt


      Calories/Kcals/Kg                          25-30


      Kcals Calculated                           [id number]


      Protein:                                   Using Current wt


      Protein g/k.7-0.8 monitor renal labs


      Protein Calculated                         45-51


      Fluid: ml                                  per MD


     Nutritional Problem


      2. Problem


       Problem                                   increased nutrition needs


       Etiology                                  increased metabolic demand for


                                                 wound healing


       Signs/Symptoms:                           open wound


      1. Problem


       Problem                                   altered nutrition related labs


       Etiology                                  renal dysfunction


       Signs/Symptoms:                           BUN 60, Cr 2.5


     Malnutrition Alert


      Is there a minimum of two criteria         No


       selected?                                 


       Query Text:Check all the applicable       


       criteria. A minimum of two criteria are   


       recommended for diagnosis of either       


       severe or non-severe malnutrition.        


     Malnutrition Related to Morbid Obesity


      Malnutrition related to morbid obesity     No


     Intervention/Recommendation


      Comments                                   1. Continue with low sodium


                                                 diet as ordered. Consider


                                                 adding supplements if PO


                                                 intake not meeting nutritional


                                                 needs. Nurses to assist pt


                                                 with all meals.


                                                 2. Add Manuel BID fot wound


                                                 healing.


                                                 3. Monitor renal labs.


                                                 Consider to limit protein


                                                 intake if BUN/Cr continue


                                                 elevated when PO intake >75%.


                                                 4. Monitor PO intake, wt, labs


                                                 and skin integrity


                                                 5. F/U as high risk in 2-3


                                                 days, -, PO check 


     Expected Outcomes/Goals


      Expected Outcomes/Goals                    1. PO intake to meet at least


                                                 75% of nutritional needs.


                                                 2. Wt stability, skin to


                                                 remain intact, labs to


                                                 approach WNL.

## 2018-11-26 NOTE — CARDIOLOGY
11/24/2018



The patient of Dr. Ferrell.



PROCEDURE:  Echocardiogram.



M-MODE ECHOCARDIOGRAM:  Mitral valve, anterior leaflet of mitral valve shows

normal excursion, EF velocity.  Posterior leaflet of mitral valve shows normal

excursion.  Left ventricular posterior wall shows increased thickness, normal

excursion.  Interventricular septum shows increased thickness, normal excursion,

hypertrophy of the left ventricle, ejection fraction 60%.  Left atrium normal. 

Aortic root shows normal dimension, normal excursion of aortic leaflets.



CONCLUSION:  Hypertrophy of the left ventricle, ejection fraction 60%.



2D ECHO:  Long axis view showed normal sized left ventricle with hypertrophy of

the left ventricle.  Left atrium normal.  Aortic root shows normal dimension,

normal excursion of aortic leaflets.  Short axis view of mitral valve normal. 

Short axis view of aortic valve normal.  Apical four chamber view showed normal

sized left ventricle with hypertrophy of the left ventricle.  Left atrium

normal.  Right ventricular cavity, right atrium normal, no pericardial effusion.



CONCLUSION:  Hypertrophy of the left ventricle, ejection fraction 60%.



Doppler study shows mild mitral regurgitation, mild tricuspid regurgitation,

right ventricular systolic pressure 36 mmHg.



CONCLUSION:  Mild mitral regurgitation, mild tricuspid regurgitation, ejection

fraction 60%.





DD: 11/26/2018 13:02

DT: 11/26/2018 16:28

JOB# [id number]  [id number]

## 2018-11-26 NOTE — CONSULTATION
DATE OF CONSULTATION:     11/26/2018



REASON FOR CONSULT:  Seen for bilateral hydronephrosis, renal insufficiency, and

urinary tract infection.



HISTORY OF PRESENT ILLNESS:  The patient is a 73-year-old nursing home resident

with multiple medical problems, admitted for nausea, vomiting, and diarrhea,

which has now almost resolved.  During this workup, ultrasound showed severe

bilateral hydronephrosis.  He came in with elevated creatinine, which has

gradually come down to a baseline of 1.7-1.8.  The patient is unable to provide

any history due to mental retardation, depression, and an undefined

psychological disorder.  He is using diapers at all times and is incontinent of

both stools and urine.  It would be very difficult to do a pre and postvoid

bladder study on him or to obtain significant or meaningful information from

him.  He has documented diagnosis of urinary tract infections and benign

prostatic hyperplasia as well.



MEDICAL HISTORY:  Positive for hypertension and hyperlipidemia.  He has had DVT

before, for which he was taking Xarelto and that is on hold for the last 3 days.

 History of renal insufficiency, presently at least but do not know about the

past.  History of depression and psychiatric disorder, not well defined. 

Possible history of seizure disorder.



PAST SURGICAL HISTORY:  Unknown.



HOME MEDICATIONS:  Vitamin D3, Dexilant, Depakote, Colace, Lexapro, Proscar,

Lopressor, olanzapine, omeprazole, oxybutynin, Altace, Xarelto, VESIcare,

Flomax, and Keflex.



ALLERGIES:  NIACIN.



REVIEW OF SYSTEMS:  Nursing home resident without any tobacco, alcohol, or drug

history.  No chest pain, coughing, or shortness of breath.  Able to feed with

the help of assistance.  Nausea and vomiting, resolved.  Tolerating diet.  No

recent seizure noted or headaches reported.  No fever or loss of weight.  He

does not have any bedsores, but does have cellulitis in his left leg, which is

being treated presently.



PHYSICAL EXAMINATION:

GENERAL:  On exam, he is awake and alert, arousable, but unable to communicate

in a meaningful way.

VITAL SIGNS:  Temperature 97.4, heart rate 65, blood pressure 144/56. 

Apparently, he is difficult to manage, refuses medicines and blood tests

frequently.

HEAD AND NECK:  Normocephalic.  Trachea central.  Pupils equal and reactive.  No

jaundice.  Thyroid and lymph nodes not palpable.  Carotid bruit absent.

CHEST:  Symmetrical.

LUNGS:  Clear.  No rales or rhonchi.

HEART:  Heart sounds, normal in sinus rhythm.  No murmur.

ABDOMEN:  Soft, nontender.  No organomegaly, mass, or hernia.  Bladder not

distended or palpable.

GENITALIA:  Normal male.  Penis and scrotal contents normal.

RECTAL:  Sphincter tone normal.  Prostate is small, smooth and benign, less than

20 grams and definitely not consistent with BPH diagnosis.

EXTREMITIES:  Cellulitis in the left leg, bandage on the right leg.  Has

pigmentation below the knee, but no skin breakdown.  No pedal edema or

lymphadenopathy.

NEUROLOGIC:  Nonfocal.



LABORATORY DATA:  White count 11.4 today with a maximum being 14.6 two days ago;

hemoglobin came in 11.1, now at 9.7; platelets 201.  PT, PTT are normal. 

Electrolytes are unremarkable.  BUN and creatinine were elevated to 68 and 2.9

and have come down just with medical treatment to 22 and 1.7, yesterday was 1.6

creatinine as well, which is his lowest recorded here.  Liver functions are

within normal range.  Urinalysis showed leukocyte esterase and blood and culture

is growing E. coli, sensitive to several antibiotics, out of which he is getting

Cipro presently.  His renal ultrasound showed bilateral hydronephrosis as

mentioned earlier and the bladder was not distended, which rules out any

significant bladder outlet obstruction and makes it difficult to explain the

hydronephrosis bilaterally.  Cardiac consult was obtained and they have cleared

him today further for any anesthesia or surgical procedure.  The family has

agreed as well, therefore, we will proceed with cystoscopy, possible TURP if

there is substantial outflow obstruction and may be bilateral retrograde

pyelograms to determine the etiology of the hydronephrosis if present.



ASSESSMENT AND PLAN:

1. Depression and psychological disorder.

2. Hypertension.

3. Hyperlipidemia.

4. History of deep venous thrombosis, off Xarelto for 3 days.

5. History of renal insufficiency, improved.

6. Urinary tract infection with E. coli, on appropriate antibiotics.

 7. Bilateral hydronephrosis, rule out bladder outlet obstruction.





DD: 11/26/2018 13:54

DT: 11/26/2018 16:44

JOB# [id number]  [id number]

[facility]

## 2018-11-27 LAB
ANION GAP SERPL CALC-SCNC: 12.1 MMOL/L (ref 7–16)
BASOPHILS # BLD AUTO: 0.1 TH/CUMM (ref 0–0.2)
BASOPHILS NFR BLD AUTO: 1 % (ref 0–2)
BUN SERPL-MCNC: 19 MG/DL (ref 7–25)
CALCIUM SERPL-MCNC: 8.4 MG/DL (ref 8.6–10.3)
CHLORIDE SERPL-SCNC: 110 MEQ/L (ref 98–107)
CO2 SERPL-SCNC: 21.9 MEQ/L (ref 21–31)
CREAT SERPL-MCNC: 1.7 MG/DL (ref 0.7–1.3)
EOSINOPHIL # BLD AUTO: 0.5 TH/CMM (ref 0.1–0.4)
EOSINOPHIL NFR BLD AUTO: 5.5 % (ref 0–5)
ERYTHROCYTE [DISTWIDTH] IN BLOOD BY AUTOMATED COUNT: 12.3 % (ref 11.5–20)
GLUCOSE SERPL-MCNC: 91 MG/DL (ref 70–105)
HCT VFR BLD CALC: 27.3 % (ref 41–60)
HGB BLD-MCNC: 9.3 GM/DL (ref 12–16)
LYMPHOCYTE AB SER FC-ACNC: 1.5 TH/CMM (ref 1.5–3)
LYMPHOCYTES NFR BLD AUTO: 16.3 % (ref 20–50)
MAGNESIUM SERPL-MCNC: 1.8 MG/DL (ref 1.9–2.7)
MCH RBC QN AUTO: 33 PG (ref 27–31)
MCHC RBC AUTO-ENTMCNC: 34 PG (ref 28–36)
MCV RBC AUTO: 96.9 FL (ref 80–99)
MONOCYTES # BLD AUTO: 0.9 TH/CMM (ref 0.3–1)
MONOCYTES NFR BLD AUTO: 9.8 % (ref 2–10)
NEUTROPHILS # BLD: 5.9 TH/CMM (ref 1.8–8)
NEUTROPHILS NFR BLD AUTO: 67.4 % (ref 40–80)
PLATELET # BLD: 241 TH/CMM (ref 150–400)
PMV BLD AUTO: 6.8 FL
POTASSIUM SERPL-SCNC: 4 MEQ/L (ref 3.5–5.1)
RBC # BLD AUTO: 2.82 MIL/CMM (ref 3.8–5.8)
SODIUM SERPL-SCNC: 140 MEQ/L (ref 136–145)
WBC # BLD AUTO: 8.9 TH/CMM (ref 4.8–10.8)

## 2018-11-27 PROCEDURE — 0T7D8ZZ DILATION OF URETHRA, VIA NATURAL OR ARTIFICIAL OPENING ENDOSCOPIC: ICD-10-PCS

## 2018-11-27 RX ADMIN — CLINDAMYCIN PHOSPHATE SCH MLS/HR: 150 INJECTION, SOLUTION, CONCENTRATE INTRAVENOUS at 03:35

## 2018-11-27 RX ADMIN — OYSTER SHELL CALCIUM WITH VITAMIN D SCH: 500; 200 TABLET, FILM COATED ORAL at 08:06

## 2018-11-27 RX ADMIN — CIPROFLOXACIN SCH MLS/HR: 2 INJECTION, SOLUTION INTRAVENOUS at 09:47

## 2018-11-27 RX ADMIN — PANTOPRAZOLE SODIUM SCH: 40 TABLET, DELAYED RELEASE ORAL at 08:08

## 2018-11-27 RX ADMIN — CLINDAMYCIN PHOSPHATE SCH MLS/HR: 150 INJECTION, SOLUTION, CONCENTRATE INTRAVENOUS at 10:45

## 2018-11-27 RX ADMIN — OYSTER SHELL CALCIUM WITH VITAMIN D SCH TAB: 500; 200 TABLET, FILM COATED ORAL at 17:20

## 2018-11-27 NOTE — GENERAL PROGRESS NOTE
Subjective





- Review of Systems


Service Date: 18


Subjective: 





more awake, comfortable; no further N/V, diarrhea





Objective





- Results


Result Diagrams: 


 18 05:05





 18 05:05


Recent Labs: 


 Laboratory Last Values











WBC  8.9 Th/cmm (4.8-10.8)   18  05:05    


 


RBC  2.82 Mil/cmm (3.80-5.80)  L  18  05:05    


 


Hgb  9.3 gm/dL (12-16)  L  18  05:05    


 


Hct  27.3 % (41.0-60)  L  18  05:05    


 


MCV  96.9 fl (80-99)   18  05:05    


 


MCH  33.0 pg (27.0-31.0)  H  18  05:05    


 


MCHC Differential  34.0 pg (28.0-36.0)   18  05:05    


 


RDW  12.3 % (11.5-20.0)   18  05:05    


 


Plt Count  241 Th/cmm (150-400)   18  05:05    


 


MPV  6.8 fl  18  05:05    


 


Neutrophils %  67.4 % (40.0-80.0)   18  05:05    


 


Lymphocytes %  16.3 % (20.0-50.0)  L  18  05:05    


 


Monocytes %  9.8 % (2.0-10.0)   18  05:05    


 


Eosinophils %  5.5 % (0.0-5.0)  H  18  05:05    


 


Basophils %  1.0 % (0.0-2.0)   18  05:05    


 


Eos Smear Source  URINE   18  11:55    


 


Eos Smear Total Cells  NONE SEEN  (NONE SEEN)   18  11:55    


 


PT  11.1 SECONDS (9.5-11.5)   18  05:52    


 


INR  1.07  (0.5-1.4)   18  05:52    


 


PTT (Actin FS)  24.8 SECONDS (26.0-38.0)  L  18  05:52    


 


Sodium  140 mEq/L (136-145)   18  05:05    


 


Potassium  4.0 mEq/L (3.5-5.1)   18  05:05    


 


Chloride  110 mEq/L ()  H  18  05:05    


 


Carbon Dioxide  21.9 mEq/L (21.0-31.0)   18  05:05    


 


Anion Gap  12.1  (7.0-16.0)   18  05:05    


 


BUN  19 mg/dL (7-25)   18  05:05    


 


Creatinine  1.7 mg/dL (0.7-1.3)  H  18  05:05    


 


Est GFR ( Amer)  TNP   18  05:05    


 


Est GFR (Non-Af Amer)  TNP   18  05:05    


 


BUN/Creatinine Ratio  11.2   18  05:05    


 


Glucose  91 mg/dL ()   18  05:05    


 


POC Glucose  85 MG/DL (70 - 105)   18  06:43    


 


Calcium  8.4 mg/dL (8.6-10.3)  L  18  05:05    


 


Phosphorus  2.6 mg/dL (2.5-5.0)   18  04:25    


 


Magnesium  1.8 mg/dL (1.9-2.7)  L  18  05:05    


 


Total Bilirubin  0.4 mg/dL (0.3-1.0)   18  05:52    


 


AST  18 U/L (13-39)   18  05:52    


 


ALT  10 U/L (7-52)   18  05:52    


 


Alkaline Phosphatase  25 U/L ()  L  18  05:52    


 


Creatine Kinase  27 U/L ()  L  18  13:50    


 


Troponin I  0.02 ng/mL (0.01-0.05)   18  13:50    


 


B-Natriuretic Peptide  51.8 pg/mL (5.0-100.0)   18  13:50    


 


Total Protein  5.9 gm/dL (6.0-8.3)  L  18  05:52    


 


Albumin  2.8 gm/dL (4.2-5.5)  L  18  05:52    


 


Globulin  3.1 gm/dL  18  05:52    


 


Albumin/Globulin Ratio  0.9  (1.0-1.8)  L  18  05:52    


 


Triglycerides  176 mg/dL (<150)  H  18  05:52    


 


Cholesterol  120 mg/dL (<200)   18  05:52    


 


LDL Cholesterol Direct  74 mg/dL ()  L  18  05:52    


 


HDL Cholesterol  25 mg/dL (23-92)   18  05:52    


 


Amylase  29 U/L ()   18  13:50    


 


Lipase  63 U/L (11-82)   18  13:50    


 


TSH  4.85 uIU/ml (0.34-5.60)   18  05:52    


 


Urine Source  RANDOM   18  23:30    


 


Urine Color  YELLOW   18  23:30    


 


Urine Clarity  HAZY  (CLEAR)   18  23:30    


 


Urine pH  5.5  (4.6 - 8.0)   18  23:30    


 


Ur Specific Gravity  1.010  (1.005-1.030)   18  23:30    


 


Urine Protein  NEGATIVE mg/dL (NEGATIVE)   18  23:30    


 


Urine Glucose (UA)  NEGATIVE mg/dL (NEGATIVE)   18  23:30    


 


Urine Ketones  NEGATIVE mg/dL (NEGATIVE)   18  23:30    


 


Urine Blood  LARGE  (NEGATIVE)  H  18  23:30    


 


Urine Nitrate  NEGATIVE  (NEGATIVE)   18  23:30    


 


Urine Bilirubin  NEGATIVE  (NEGATIVE)   18  23:30    


 


Urine Urobilinogen  0.2 E.U./dL (0.2 - 1.0)   18  23:30    


 


Ur Leukocyte Esterase  LARGE  (NEGATIVE)  H  18  23:30    


 


Urine RBC  0-2 /hpf (0-5)  H  18  23:30    


 


Urine WBC  6-10 /hpf (0-5)   18  23:30    


 


Ur Epithelial Cells  NONE SEEN /lpf (FEW)   18  23:30    


 


Urine Bacteria  MODERATE /hpf (NONE SEEN)  H  18  23:30    


 


Ur Random Sodium  119 mmol/L  18  11:55    


 


Urine Creatinine  74.0 mg/dl (39.0-259.0)   18  11:55    


 


Microalb/Creat Ratio  195.2 mg/g creat (0.0-30.0)  H  18  11:55    














- Physical Exam


Vitals and I&O: 


 Vital Signs











Temp  98.4 F   18 11:34


 


Pulse  70   18 11:34


 


Resp  17   18 11:34


 


BP  120/80   18 11:34


 


Pulse Ox  99   18 11:34








 Intake & Output











 18





 18:59 06:59 18:59


 


Intake Total 152 544 


 


Balance 152 544 


 


Weight (lbs) 66.769 kg 66.678 kg 


 


Intake:   


 


  Intake, IV Amount 152 104 


 


    Ciprofloxacin 200mg 100  





    Premix  mg In 100   





    ml @ 100 mls/hr IV Q24HR   





    [facility] Rx#:[id number]   


 


    Clindamycin 300 mg In 52 104 





    Sodium Chloride 0.9% 50   





    ml @ 100 mls/hr IV Q8H   





    [facility] Rx#:[id number]   


 


  Oral  440 


 


Other:   


 


  # Voids 2 2 


 


  # Bowel Movements  0 


 


  Weight Source Bedscale Bedscale 











Active Medications: 


Current Medications





Acetaminophen (Tylenol)  650 mg PO Q6H PRN


   PRN Reason: fever/pain


   Stop: 19 13:42


Atorvastatin Calcium (Lipitor)  20 mg PO DAILY [facility]; Protocol


   Stop: 19 08:59


   Last Admin: 18 08:06 Dose:  Not Given


Calcium/Vitamin D (Oscal W/Vitamin D)  1 tab PO BID [facility]


   Stop: 19 16:59


   Last Admin: 18 08:06 Dose:  Not Given


Cholecalciferol (Vitamin D3)  1,000 iu PO DAILY [facility]


   Stop: 19 13:44


   Last Admin: 18 08:07 Dose:  Not Given


Diltiazem HCl (Cardizem)  5 mg IVP Q4H PRN


   PRN Reason: HR MORE THAN 120


   Stop: 19 04:44


Divalproex Sodium (Depakote Er)  500 mg PO TID [facility]; Protocol


   Stop: 19 13:59


   Last Admin: 18 08:07 Dose:  Not Given


Docusate Sodium (Colace)  100 mg PO DAILY [facility]


   Stop: 19 13:44


   Last Admin: 18 08:07 Dose:  Not Given


Escitalopram Oxalate (Lexapro)  10 mg PO DAILY [facility]; Protocol


   Stop: 19 13:44


   Last Admin: 18 08:07 Dose:  Not Given


Sodium Chloride (Nacl 0.9%)  1,000 mls @ 100 mls/hr IV .Q10H [facility]


   Stop: 19 17:44


   Last Admin: 18 10:00 Dose:  100 mls/hr


Ciprofloxacin (Cipro 200mg Premix Pb)  200 mg in 100 mls @ 100 mls/hr IV Q24HR 

[facility]


   Stop: 19 09:44


   Last Admin: 18 09:47 Dose:  100 mls/hr


Clindamycin Phosphate 300 mg/ (Sodium Chloride)  52 mls @ 100 mls/hr IV Q8H [facility]


   Stop: 19 09:59


   Last Admin: 18 10:45 Dose:  100 mls/hr


Magnesium Hydroxide (Milk Of Magnesia)  30 ml PO DAILY PRN


   PRN Reason: Constipation


   Stop: 19 13:45


Metoprolol Tartrate (Lopressor)  25 mg PO BID [facility]


   Stop: 19 16:59


   Last Admin: 18 08:07 Dose:  Not Given


Olanzapine (Zyprexa)  5 mg PO HS [facility]; Protocol


   Stop: 19 20:59


   Last Admin: 18 20:42 Dose:  5 mg


Oxybutynin Chloride (Ditropan)  10 mg PO DAILY [facility]


   Stop: 19 08:59


   Last Admin: 18 08:08 Dose:  Not Given


Pantoprazole Sodium (Protonix)  40 mg PO DAILY [facility]


   Stop: 19 08:59


   Last Admin: 18 08:08 Dose:  Not Given








General: Alert, No acute distress


HEENT: Atraumatic, Mucous membr. moist/pink


Neck: Supple, +2 carotid pulse wo bruit


Cardiovascular: Regular rate, Normal S1, Normal S2


Lungs: Clear to auscultation


Abdomen: Bowel sounds, Soft


Extremities: no Edema


Neurological: Sensation intact


Skin: no Significant lesion


Psych/Mental Status: Mood NL





- Procedures


Procedures: 


 Procedures











Procedure Code Date


 


C & S-INTEGUMENT 91.63 95


 


CULTURE OTHR SPECIMN AEROBIC 71444 95


 


DX ULTRASOUND-VASCULAR 88.77 95


 


ELECTROCARDIOGRAPH MONIT 89.54 95


 


INJECT ANTIBIOTIC 99.21 10/09/95


 


INJECT ANTICOAGULANT 99.19 95


 


MICROBE SUSCEPTIBLE DISK 76033 95


 


OTHER C.A.T. SCAN 88.38 95


 


URINARY SYSTEM X-RAY NEC 87.79 95


 


WHIRLPOOL THERAPY 18822 95


 


WHIRLPOOL TREATMENT 93.32 95














Assessment/Plan





- Assessment


Assessment: 





DEVIN


Acute gastroenteritis


Dehydration


Severe B/L hydronephrosis


Severe ID


Paranoid schizo


Ess Htn


BPH


Depression


LE DVT


Epilepsy


 








- Plan


Plan: 


Lab - Result Diagrams





 18 04:25 





 18 04:25 





Current Medications





Acetaminophen (Tylenol)  650 mg PO Q6H PRN


   PRN Reason: fever/pain


   Stop: 19 13:42


Calcium/Vitamin D (Oscal W/Vitamin D)  1 tab PO BID [facility]


   Stop: 19 16:59


   Last Admin: 18 10:17 Dose:  1 tab


Cholecalciferol (Vitamin D3)  1,000 iu PO DAILY [facility]


   Stop: 19 13:44


   Last Admin: 18 10:17 Dose:  1,000 iu


Divalproex Sodium (Depakote Er)  500 mg PO TID [facility]; Protocol


   Stop: 19 13:59


   Last Admin: 18 14:21 Dose:  500 mg


Docusate Sodium (Colace)  100 mg PO DAILY [facility]


   Stop: 19 13:44


   Last Admin: 18 10:17 Dose:  100 mg


Escitalopram Oxalate (Lexapro)  10 mg PO DAILY [facility]; Protocol


   Stop: 19 13:44


   Last Admin: 18 10:16 Dose:  10 mg


Finasteride (Proscar)  5 mg PO HS [facility]; Protocol


   Stop: 19 20:59


   Last Admin: 18 21:05 Dose:  5 mg


Sodium Chloride (Nacl 0.9%)  1,000 mls @ 100 mls/hr IV .Q10H [facility]


   Stop: 19 17:44


   Last Admin: 18 03:31 Dose:  100 mls/hr


Ceftriaxone Sodium 1 gm/ (Sodium Chloride)  50 mls @ 100 mls/hr IV Q24HR [facility]


   Stop: 19 16:59


   Last Admin: 18 16:19 Dose:  100 mls/hr


Ciprofloxacin (Cipro 200mg Premix Pb)  200 mg in 100 mls @ 100 mls/hr IV Q24HR 

[facility]


   Stop: 19 09:44


   Last Admin: 18 10:21 Dose:  100 mls/hr


Magnesium Hydroxide (Milk Of Magnesia)  30 ml PO DAILY PRN


   PRN Reason: Constipation


   Stop: 19 13:45


Metoprolol Tartrate (Lopressor)  25 mg PO BID [facility]


   Stop: 19 16:59


   Last Admin: 18 10:16 Dose:  25 mg


Olanzapine (Zyprexa)  5 mg PO HS [facility]; Protocol


   Stop: 19 20:59


   Last Admin: 18 21:05 Dose:  5 mg


Oxybutynin Chloride (Ditropan)  10 mg PO DAILY [facility]


   Stop: 19 08:59


   Last Admin: 18 10:16 Dose:  10 mg


Pantoprazole Sodium (Protonix)  40 mg PO DAILY [facility]


   Stop: 19 08:59


   Last Admin: 18 10:17 Dose:  40 mg


Rivaroxaban (Xarelto)  10 mg PO DAILY [facility]


   Stop: 19 08:59


   Last Admin: 18 10:17 Dose:  10 mg


Tamsulosin HCl (Flomax)  0.4 mg PO HS [facility]


   Stop: 19 20:59


   Last Admin: 18 21:05 Dose:  0.4 mg





 


Lab - Result Diagrams





 18 05:05 





 18 05:05 











 





Kidney fnc stable w/ BUN/CR of 19/1.7


no further N/V/D


Renal US revealed severe B/L hydroneprosis but nondistended bladder


possible ureteral stricture due to scar, fibrosis, mass, calculi?


continue IVF, encourage po intake


S/P Cystoscopy


f/u electrolytes, cbc


WBC down to 8.9











Nutritional Asmnt/Malnutr-PDOC





- Dietary Evaluation


Malnutrition Findings (Please click <Entered> for more info): 








Nutritional Asmnt/Malnutrition                             Start:  18 11:

06


Text:                                                      Status: Complete    

  


Freq:                                                                          

  


Protocol:                                                                      

  


 Document     18 11:07  AICHAG  (Rec: 18 11:35  LCHENG  [id number])


 Nutritional Asmnt/Malnutrition


     Patient General Information


      Nutritional Screening                      High Risk


                                                 Consult


      Diagnosis                                  acute renal insufficient,


                                                 urosepsis


      Pertinent Medical Hx/Surgical Hx           HTN, dementia, depression,


                                                 schizophrenia


      Subjective Information                     Consult receved for wound. Pt


                                                 seen lying in bed at time of


                                                 visit, awake, non-verbal. Per


                                                 CNA, pt only consumed liquid


                                                 from breakfast tray today.


                                                 Observed pt was able to eat


                                                 chocolate chip cookie, pt did


                                                 not want the soup.


      Current Diet Order/ Nutrition Support      low sodium


      Pertinent Medications                      nacl 0.9%


      Pertinent Labs                              cl 109, BUN 60, Cr 2.5,


                                                 Glucose 84


                                                  BUN 68, Cr 2.9, glucose


                                                 134


     Nutritional Hx/Data


      Height                                     1.68 m


      Height (Calculated Centimeters)            167.6


      Current Weight (lbs)                       63.957 kg


      Weight (Calculated Kilograms)              64.0


      Weight (Calculated Grams)                  08264.5


      Ideal Body Weight                          142


      Body Mass Index (BMI)                      22.7


     GI Symptoms


      GI Symptoms                                None


      Difficult in:                              None


      Skin Integrity/Comment:                    reddened to left arm, left


                                                 buttocks, LT back big toe


                                                 open wound to left toe, left


                                                 lower leg


                                                 scar to left ankle


                                                 refugio score 11


      Current %PO                                Good (%)


     Estimated Nutritional Goals


      BEE in Kcals:                              Using Current wt


      Calories/Kcals/Kg                          25-30


      Kcals Calculated                           1710-5564


      Protein:                                   Using Current wt


      Protein g/k.7-0.8 monitor renal labs


      Protein Calculated                         45-51


      Fluid: ml                                  per MD


     Nutritional Problem


      2. Problem


       Problem                                   increased nutrition needs


       Etiology                                  increased metabolic demand for


                                                 wound healing


       Signs/Symptoms:                           open wound


      1. Problem


       Problem                                   altered nutrition related labs


       Etiology                                  renal dysfunction


       Signs/Symptoms:                           BUN 60, Cr 2.5


     Malnutrition Alert


      Is there a minimum of two criteria         No


       selected?                                 


       Query Text:Check all the applicable       


       criteria. A minimum of two criteria are   


       recommended for diagnosis of either       


       severe or non-severe malnutrition.        


     Malnutrition Related to Morbid Obesity


      Malnutrition related to morbid obesity     No


     Intervention/Recommendation


      Comments                                   1. Continue with low sodium


                                                 diet as ordered. Consider


                                                 adding supplements if PO


                                                 intake not meeting nutritional


                                                 needs. Nurses to assist pt


                                                 with all meals.


                                                 2. Add Manuel BID fot wound


                                                 healing.


                                                 3. Monitor renal labs.


                                                 Consider to limit protein


                                                 intake if BUN/Cr continue


                                                 elevated when PO intake >75%.


                                                 4. Monitor PO intake, wt, labs


                                                 and skin integrity


                                                 5. F/U as high risk in 2-3


                                                 days, -, PO check 


     Expected Outcomes/Goals


      Expected Outcomes/Goals                    1. PO intake to meet at least


                                                 75% of nutritional needs.


                                                 2. Wt stability, skin to


                                                 remain intact, labs to


                                                 approach WNL.

## 2018-11-27 NOTE — OPERATIVE REPORT
DATE OF SURGERY:  11/27/2018



PREOPERATIVE DIAGNOSES:  Bilateral hydronephrosis and urinary tract infection as

well as renal insufficiency.



POSTOPERATIVE DIAGNOSES:  Bilateral hydronephrosis and urinary tract infection

as well as renal insufficiency.  Neurogenic bladder and very small prostate.



SURGEON:  Evelio Langford MD.

    

NAME OF PROCEDURE:  Cystoscopy and dilation of the urethra under general

anesthesia.



INDICATIONS:  The patient is a 73-year-old with multiple medical problems

including renal insufficiency with a baseline creatinine of 1.7, urinary tract

infection and bilateral hydronephrosis on ultrasound.  He was admitted for

unrelated condition of nausea, vomiting, abdominal pain and diarrhea, which have

all resolved.



FINDINGS:  Cystoscopy reveals absence of stricture.  The prostatic urethra is

wide open consistent with a previous TURP and the bladder is trabeculated and

very small capacity and has changes of hemorrhagic cystitis and most likely

hyperreflexic as it does not accommodate more than  mL of fluid.  Blood

loss was minimal 5-10 mL.  There were no complications.



PROCEDURE IN DETAIL:  The patient was brought to the operating room, prepped and

draped in the dorsal lithotomy position after general anesthesia was induced. 

After dilating the meatus and the distal urethra a cystoscope was introduced

under vision and urethra examined.  There was a small false passage at 6 o'clock

position in the bulbous urethra that was easily negotiable and the prostatic

fossa looked wide open with changes of resection in the past.  The bladder was

then entered and found to be very small capacity and started bleeding

immediately with a poor visibility in spite of repeated attempts using both the

30 and 70-degree lenses.  I could not identify the ureteral orifices.  After a

long time, I decided to abandon this attempt and simply drain the bladder to

complete the procedure.  The patient seems to have a very small capacity

hyperreflexic bladder, which may be the cause of his bilateral hydronephrosis. 

There was no bladder outlet obstruction of any kind.  Therefore, he does not

require any Flomax or Proscar in the future, but does require the

anticholinergics like oxybutynin or VESIcare with the safety limits to reduce

the hyperreflexia and the pressure on the ureters and the kidneys due to a

noncompliant low capacity bladder.  He was returned to recovery in stable

condition.





DD: 11/27/2018 12:27

DT: 11/27/2018 15:37

JOB# [id number]  [id number]

## 2018-11-28 LAB
ANION GAP SERPL CALC-SCNC: 11.1 MMOL/L (ref 7–16)
BASOPHILS # BLD AUTO: 0.1 TH/CUMM (ref 0–0.2)
BASOPHILS NFR BLD AUTO: 1 % (ref 0–2)
BUN SERPL-MCNC: 20 MG/DL (ref 7–25)
CALCIUM SERPL-MCNC: 8.7 MG/DL (ref 8.6–10.3)
CHLORIDE SERPL-SCNC: 111 MEQ/L (ref 98–107)
CO2 SERPL-SCNC: 23.2 MEQ/L (ref 21–31)
CREAT SERPL-MCNC: 1.6 MG/DL (ref 0.7–1.3)
EOSINOPHIL # BLD AUTO: 0.5 TH/CMM (ref 0.1–0.4)
EOSINOPHIL NFR BLD AUTO: 6.3 % (ref 0–5)
ERYTHROCYTE [DISTWIDTH] IN BLOOD BY AUTOMATED COUNT: 12.5 % (ref 11.5–20)
GLUCOSE SERPL-MCNC: 93 MG/DL (ref 70–105)
HCT VFR BLD CALC: 28.5 % (ref 41–60)
HGB BLD-MCNC: 9.5 GM/DL (ref 12–16)
LYMPHOCYTE AB SER FC-ACNC: 1.7 TH/CMM (ref 1.5–3)
LYMPHOCYTES NFR BLD AUTO: 20.7 % (ref 20–50)
MCH RBC QN AUTO: 32.9 PG (ref 27–31)
MCHC RBC AUTO-ENTMCNC: 33.2 PG (ref 28–36)
MCV RBC AUTO: 99 FL (ref 80–99)
MONOCYTES # BLD AUTO: 0.8 TH/CMM (ref 0.3–1)
MONOCYTES NFR BLD AUTO: 9.8 % (ref 2–10)
NEUTROPHILS # BLD: 5.1 TH/CMM (ref 1.8–8)
NEUTROPHILS NFR BLD AUTO: 62.2 % (ref 40–80)
PLATELET # BLD: 237 TH/CMM (ref 150–400)
PMV BLD AUTO: 6.8 FL
POTASSIUM SERPL-SCNC: 4.3 MEQ/L (ref 3.5–5.1)
RBC # BLD AUTO: 2.88 MIL/CMM (ref 3.8–5.8)
SODIUM SERPL-SCNC: 141 MEQ/L (ref 136–145)
WBC # BLD AUTO: 8.2 TH/CMM (ref 4.8–10.8)

## 2018-11-28 RX ADMIN — OYSTER SHELL CALCIUM WITH VITAMIN D SCH TAB: 500; 200 TABLET, FILM COATED ORAL at 08:22

## 2018-11-28 RX ADMIN — CIPROFLOXACIN SCH MLS/HR: 2 INJECTION, SOLUTION INTRAVENOUS at 09:38

## 2018-11-28 RX ADMIN — PANTOPRAZOLE SODIUM SCH MG: 40 TABLET, DELAYED RELEASE ORAL at 08:21

## 2018-11-28 RX ADMIN — OYSTER SHELL CALCIUM WITH VITAMIN D SCH TAB: 500; 200 TABLET, FILM COATED ORAL at 17:27

## 2018-11-28 NOTE — GENERAL PROGRESS NOTE
Subjective





- Review of Systems


Service Date: 18


Subjective: 





more awake, comfortable; no further N/V, diarrhea





Objective





- Results


Result Diagrams: 


 18 05:40





 18 05:40


Recent Labs: 


 Laboratory Last Values











WBC  8.2 Th/cmm (4.8-10.8)   18  05:40    


 


RBC  2.88 Mil/cmm (3.80-5.80)  L  18  05:40    


 


Hgb  9.5 gm/dL (12-16)  L  18  05:40    


 


Hct  28.5 % (41.0-60)  L  18  05:40    


 


MCV  99.0 fl (80-99)   18  05:40    


 


MCH  32.9 pg (27.0-31.0)  H  18  05:40    


 


MCHC Differential  33.2 pg (28.0-36.0)   18  05:40    


 


RDW  12.5 % (11.5-20.0)   18  05:40    


 


Plt Count  237 Th/cmm (150-400)   18  05:40    


 


MPV  6.8 fl  18  05:40    


 


Neutrophils %  62.2 % (40.0-80.0)   18  05:40    


 


Lymphocytes %  20.7 % (20.0-50.0)   18  05:40    


 


Monocytes %  9.8 % (2.0-10.0)   18  05:40    


 


Eosinophils %  6.3 % (0.0-5.0)  H  18  05:40    


 


Basophils %  1.0 % (0.0-2.0)   18  05:40    


 


Eos Smear Source  URINE   18  11:55    


 


Eos Smear Total Cells  NONE SEEN  (NONE SEEN)   18  11:55    


 


PT  11.1 SECONDS (9.5-11.5)   18  05:52    


 


INR  1.07  (0.5-1.4)   18  05:52    


 


PTT (Actin FS)  24.8 SECONDS (26.0-38.0)  L  18  05:52    


 


Sodium  141 mEq/L (136-145)   18  05:40    


 


Potassium  4.3 mEq/L (3.5-5.1)   18  05:40    


 


Chloride  111 mEq/L ()  H  18  05:40    


 


Carbon Dioxide  23.2 mEq/L (21.0-31.0)   18  05:40    


 


Anion Gap  11.1  (7.0-16.0)   18  05:40    


 


BUN  20 mg/dL (7-25)   18  05:40    


 


Creatinine  1.6 mg/dL (0.7-1.3)  H  18  05:40    


 


Est GFR ( Amer)  TNP   18  05:40    


 


Est GFR (Non-Af Amer)  TNP   18  05:40    


 


BUN/Creatinine Ratio  12.5   18  05:40    


 


Glucose  93 mg/dL ()   18  05:40    


 


POC Glucose  85 MG/DL (70 - 105)   18  06:43    


 


Calcium  8.7 mg/dL (8.6-10.3)   18  05:40    


 


Phosphorus  2.6 mg/dL (2.5-5.0)   18  04:25    


 


Magnesium  1.8 mg/dL (1.9-2.7)  L  18  05:05    


 


Total Bilirubin  0.4 mg/dL (0.3-1.0)   18  05:52    


 


AST  18 U/L (13-39)   18  05:52    


 


ALT  10 U/L (7-52)   18  05:52    


 


Alkaline Phosphatase  25 U/L ()  L  18  05:52    


 


Creatine Kinase  27 U/L ()  L  18  13:50    


 


Troponin I  0.02 ng/mL (0.01-0.05)   18  13:50    


 


B-Natriuretic Peptide  51.8 pg/mL (5.0-100.0)   18  13:50    


 


Total Protein  5.9 gm/dL (6.0-8.3)  L  18  05:52    


 


Albumin  2.8 gm/dL (4.2-5.5)  L  18  05:52    


 


Globulin  3.1 gm/dL  18  05:52    


 


Albumin/Globulin Ratio  0.9  (1.0-1.8)  L  18  05:52    


 


Triglycerides  176 mg/dL (<150)  H  18  05:52    


 


Cholesterol  120 mg/dL (<200)   18  05:52    


 


LDL Cholesterol Direct  74 mg/dL ()  L  18  05:52    


 


HDL Cholesterol  25 mg/dL (23-92)   18  05:52    


 


Amylase  29 U/L ()   18  13:50    


 


Lipase  63 U/L (11-82)   18  13:50    


 


TSH  4.85 uIU/ml (0.34-5.60)   18  05:52    


 


Urine Source  RANDOM   18  23:30    


 


Urine Color  YELLOW   18  23:30    


 


Urine Clarity  HAZY  (CLEAR)   18  23:30    


 


Urine pH  5.5  (4.6 - 8.0)   18  23:30    


 


Ur Specific Gravity  1.010  (1.005-1.030)   18  23:30    


 


Urine Protein  NEGATIVE mg/dL (NEGATIVE)   18  23:30    


 


Urine Glucose (UA)  NEGATIVE mg/dL (NEGATIVE)   18  23:30    


 


Urine Ketones  NEGATIVE mg/dL (NEGATIVE)   18  23:30    


 


Urine Blood  LARGE  (NEGATIVE)  H  18  23:30    


 


Urine Nitrate  NEGATIVE  (NEGATIVE)   18  23:30    


 


Urine Bilirubin  NEGATIVE  (NEGATIVE)   18  23:30    


 


Urine Urobilinogen  0.2 E.U./dL (0.2 - 1.0)   18  23:30    


 


Ur Leukocyte Esterase  LARGE  (NEGATIVE)  H  18  23:30    


 


Urine RBC  0-2 /hpf (0-5)  H  18  23:30    


 


Urine WBC  6-10 /hpf (0-5)   18  23:30    


 


Ur Epithelial Cells  NONE SEEN /lpf (FEW)   18  23:30    


 


Urine Bacteria  MODERATE /hpf (NONE SEEN)  H  18  23:30    


 


Ur Random Sodium  119 mmol/L  18  11:55    


 


Urine Creatinine  74.0 mg/dl (39.0-259.0)   18  11:55    


 


Microalb/Creat Ratio  195.2 mg/g creat (0.0-30.0)  H  18  11:55    














- Physical Exam


Vitals and I&O: 


 Vital Signs











Temp  97.3 F   18 11:27


 


Pulse  76   18 11:27


 


Resp  18   18 11:27


 


BP  110/62   18 11:27


 


Pulse Ox  100   18 11:27








 Intake & Output











 18





 18:59 06:59 18:59


 


Intake Total 190 1050 


 


Balance 190 1050 


 


Weight (lbs) 66.86 kg 66.791 kg 


 


Intake:   


 


  Intake, IV Amount 100 1000 


 


    Ciprofloxacin 200mg 100  





    Premix  mg In 100   





    ml @ 100 mls/hr IV Q24HR   





    [facility] Rx#:[id number]   


 


    Sodium Chloride 0.9% 1,  1000 





    000 ml @ 100 mls/hr IV .   





    Q10H [facility] Rx#:[id number]   


 


  Oral 90 50 


 


Other:   


 


  # Voids 1 2 


 


  # Bowel Movements  0 


 


  Weight Source Bedscale Bedscale 











Active Medications: 


Current Medications





Acetaminophen (Tylenol)  650 mg PO Q6H PRN


   PRN Reason: fever/pain


   Stop: 19 13:42


   Last Admin: 18 21:32 Dose:  650 mg


Atorvastatin Calcium (Lipitor)  20 mg PO DAILY [facility]; Protocol


   Stop: 19 08:59


   Last Admin: 18 08:21 Dose:  20 mg


Calcium/Vitamin D (Oscal W/Vitamin D)  1 tab PO BID [facility]


   Stop: 19 16:59


   Last Admin: 18 08:22 Dose:  1 tab


Castor Oil/Egyptian Balsam/Trypsin (Venelex)  1 appl TP DAILY [facility]


   Stop: 19 17:44


   Last Admin: 18 08:31 Dose:  1 appl


Cholecalciferol (Vitamin D3)  1,000 iu PO DAILY [facility]


   Stop: 19 13:44


   Last Admin: 18 08:22 Dose:  1,000 iu


Diltiazem HCl (Cardizem)  5 mg IVP Q4H PRN


   PRN Reason: HR MORE THAN 120


   Stop: 19 04:44


Divalproex Sodium (Depakote Er)  500 mg PO TID [facility]; Protocol


   Stop: 19 13:59


   Last Admin: 18 09:25 Dose:  500 mg


Docusate Sodium (Colace)  100 mg PO DAILY [facility]


   Stop: 19 13:44


   Last Admin: 18 08:22 Dose:  100 mg


Escitalopram Oxalate (Lexapro)  10 mg PO DAILY [facility]; Protocol


   Stop: 19 13:44


   Last Admin: 18 08:21 Dose:  10 mg


Sodium Chloride (Nacl 0.9%)  1,000 mls @ 100 mls/hr IV .Q10H [facility]


   Stop: 19 17:44


   Last Admin: 18 05:49 Dose:  100 mls/hr


Ciprofloxacin (Cipro 200mg Premix Pb)  200 mg in 100 mls @ 100 mls/hr IV Q24HR 

[facility]


   Stop: 19 09:44


   Last Admin: 18 09:38 Dose:  100 mls/hr


Clindamycin Phosphate 300 mg/ (Sodium Chloride)  52 mls @ 100 mls/hr IV Q8H [facility]


   Stop: 19 09:59


   Last Admin: 18 10:45 Dose:  100 mls/hr


Magnesium Hydroxide (Milk Of Magnesia)  30 ml PO DAILY PRN


   PRN Reason: Constipation


   Stop: 19 13:45


Metoprolol Tartrate (Lopressor)  25 mg PO BID [facility]


   Stop: 19 16:59


   Last Admin: 18 08:26 Dose:  25 mg


Olanzapine (Zyprexa)  5 mg PO HS [facility]; Protocol


   Stop: 19 20:59


   Last Admin: 18 21:32 Dose:  5 mg


Oxybutynin Chloride (Ditropan)  10 mg PO DAILY [facility]


   Stop: 19 08:59


   Last Admin: 18 08:21 Dose:  10 mg


Pantoprazole Sodium (Protonix)  40 mg PO DAILY [facility]


   Stop: 19 08:59


   Last Admin: 18 08:21 Dose:  40 mg








General: Alert, No acute distress


HEENT: Atraumatic, Mucous membr. moist/pink


Neck: Supple, +2 carotid pulse wo bruit


Cardiovascular: Regular rate, Normal S1, Normal S2


Lungs: Clear to auscultation


Abdomen: Bowel sounds, Soft


Extremities: no Edema


Neurological: Sensation intact


Skin: no Significant lesion


Psych/Mental Status: Mood NL





- Procedures


Procedures: 


 Procedures











Procedure Code Date


 


C & S-INTEGUMENT 91.63 95


 


CULTURE OTHR SPECIMN AEROBIC 71056 95


 


DX ULTRASOUND-VASCULAR 88.77 95


 


ELECTROCARDIOGRAPH MONIT 89.54 95


 


INJECT ANTIBIOTIC 99.21 10/09/95


 


INJECT ANTICOAGULANT 99.19 95


 


MICROBE SUSCEPTIBLE DISK 49680 95


 


OTHER C.A.T. SCAN 88.38 95


 


URINARY SYSTEM X-RAY NEC 87.79 95


 


WHIRLPOOL THERAPY 00877 95


 


WHIRLPOOL TREATMENT 93.32 95














Assessment/Plan





- Assessment


Assessment: 





DEVIN


Acute gastroenteritis


Dehydration


Severe B/L hydronephrosis 2/2 to Hyperreflexic small bladder


Severe ID


Paranoid schizo


Ess Htn


BPH


Depression


LE DVT


Epilepsy


 








- Plan


Plan: 


Lab - Result Diagrams





 18 04:25 





 18 04:25 





Current Medications





Acetaminophen (Tylenol)  650 mg PO Q6H PRN


   PRN Reason: fever/pain


   Stop: 19 13:42


Calcium/Vitamin D (Oscal W/Vitamin D)  1 tab PO BID [facility]


   Stop: 19 16:59


   Last Admin: 18 10:17 Dose:  1 tab


Cholecalciferol (Vitamin D3)  1,000 iu PO DAILY [facility]


   Stop: 19 13:44


   Last Admin: 18 10:17 Dose:  1,000 iu


Divalproex Sodium (Depakote Er)  500 mg PO TID [facility]; Protocol


   Stop: 19 13:59


   Last Admin: 18 14:21 Dose:  500 mg


Docusate Sodium (Colace)  100 mg PO DAILY [facility]


   Stop: 19 13:44


   Last Admin: 18 10:17 Dose:  100 mg


Escitalopram Oxalate (Lexapro)  10 mg PO DAILY [facility]; Protocol


   Stop: 19 13:44


   Last Admin: 18 10:16 Dose:  10 mg


Finasteride (Proscar)  5 mg PO HS [facility]; Protocol


   Stop: 19 20:59


   Last Admin: 18 21:05 Dose:  5 mg


Sodium Chloride (Nacl 0.9%)  1,000 mls @ 100 mls/hr IV .Q10H [facility]


   Stop: 19 17:44


   Last Admin: 18 03:31 Dose:  100 mls/hr


Ceftriaxone Sodium 1 gm/ (Sodium Chloride)  50 mls @ 100 mls/hr IV Q24HR [facility]


   Stop: 19 16:59


   Last Admin: 18 16:19 Dose:  100 mls/hr


Ciprofloxacin (Cipro 200mg Premix Pb)  200 mg in 100 mls @ 100 mls/hr IV Q24HR 

[facility]


   Stop: 19 09:44


   Last Admin: 18 10:21 Dose:  100 mls/hr


Magnesium Hydroxide (Milk Of Magnesia)  30 ml PO DAILY PRN


   PRN Reason: Constipation


   Stop: 19 13:45


Metoprolol Tartrate (Lopressor)  25 mg PO BID [facility]


   Stop: 19 16:59


   Last Admin: 18 10:16 Dose:  25 mg


Olanzapine (Zyprexa)  5 mg PO HS [facility]; Protocol


   Stop: 19 20:59


   Last Admin: 18 21:05 Dose:  5 mg


Oxybutynin Chloride (Ditropan)  10 mg PO DAILY [facility]


   Stop: 19 08:59


   Last Admin: 18 10:16 Dose:  10 mg


Pantoprazole Sodium (Protonix)  40 mg PO DAILY [facility]


   Stop: 19 08:59


   Last Admin: 18 10:17 Dose:  40 mg


Rivaroxaban (Xarelto)  10 mg PO DAILY [facility]


   Stop: 19 08:59


   Last Admin: 18 10:17 Dose:  10 mg


Tamsulosin HCl (Flomax)  0.4 mg PO [facility]


   Stop: 19 20:59


   Last Admin: 18 21:05 Dose:  0.4 mg





 








 


Lab - Result Diagrams





 18 05:40 





 18 05:40 








 





Kidney fnc stable w/ BUN/CR of 20/1.6


no further N/V/D


Renal US revealed severe B/L hydroneprosis but nondistended bladder


continue IVF, encourage po intake


S/P Cystoscopy


f/u electrolytes, cbc


WBC down to 8.2











Nutritional Asmnt/Malnutr-PDOC





- Dietary Evaluation


Malnutrition Findings (Please click <Entered> for more info): 








Nutritional Asmnt/Malnutrition                             Start:  18 11:

06


Text:                                                      Status: Complete    

  


Freq:                                                                          

  


Protocol:                                                                      

  


 Document     18 11:07  HENG  (Rec: 18 11:35  [facility]  [id number])


 Nutritional Asmnt/Malnutrition


     Patient General Information


      Nutritional Screening                      High Risk


                                                 Consult


      Diagnosis                                  acute renal insufficient,


                                                 urosepsis


      Pertinent Medical Hx/Surgical Hx           HTN, dementia, depression,


                                                 schizophrenia


      Subjective Information                     Consult receved for wound. Pt


                                                 seen lying in bed at time of


                                                 visit, awake, non-verbal. Per


                                                 CNA, pt only consumed liquid


                                                 from breakfast tray today.


                                                 Observed pt was able to eat


                                                 chocolate chip cookie, pt did


                                                 not want the soup.


      Current Diet Order/ Nutrition Support      low sodium


      Pertinent Medications                      nacl 0.9%


      Pertinent Labs                              cl 109, BUN 60, Cr 2.5,


                                                 Glucose 84


                                                  BUN 68, Cr 2.9, glucose


                                                 134


     Nutritional Hx/Data


      Height                                     1.68 m


      Height (Calculated Centimeters)            167.6


      Current Weight (lbs)                       63.957 kg


      Weight (Calculated Kilograms)              64.0


      Weight (Calculated Grams)                  49174.5


      Ideal Body Weight                          142


      Body Mass Index (BMI)                      22.7


     GI Symptoms


      GI Symptoms                                None


      Difficult in:                              None


      Skin Integrity/Comment:                    reddened to left arm, left


                                                 buttocks, LT back big toe


                                                 open wound to left toe, left


                                                 lower leg


                                                 scar to left ankle


                                                 refugio score 11


      Current %PO                                Good (%)


     Estimated Nutritional Goals


      BEE in Kcals:                              Using Current wt


      Calories/Kcals/Kg                          25-30


      Kcals Calculated                           5129-2815


      Protein:                                   Using Current wt


      Protein g/k.7-0.8 monitor renal labs


      Protein Calculated                         45-51


      Fluid: ml                                  per MD


     Nutritional Problem


      2. Problem


       Problem                                   increased nutrition needs


       Etiology                                  increased metabolic demand for


                                                 wound healing


       Signs/Symptoms:                           open wound


      1. Problem


       Problem                                   altered nutrition related labs


       Etiology                                  renal dysfunction


       Signs/Symptoms:                           BUN 60, Cr 2.5


     Malnutrition Alert


      Is there a minimum of two criteria         No


       selected?                                 


       Query Text:Check all the applicable       


       criteria. A minimum of two criteria are   


       recommended for diagnosis of either       


       severe or non-severe malnutrition.        


     Malnutrition Related to Morbid Obesity


      Malnutrition related to morbid obesity     No


     Intervention/Recommendation


      Comments                                   1. Continue with low sodium


                                                 diet as ordered. Consider


                                                 adding supplements if PO


                                                 intake not meeting nutritional


                                                 needs. Nurses to assist pt


                                                 with all meals.


                                                 2. Add Maneul BID fot wound


                                                 healing.


                                                 3. Monitor renal labs.


                                                 Consider to limit protein


                                                 intake if BUN/Cr continue


                                                 elevated when PO intake >75%.


                                                 4. Monitor PO intake, wt, labs


                                                 and skin integrity


                                                 5. F/U as high risk in 2-3


                                                 days, -, PO check 


     Expected Outcomes/Goals


      Expected Outcomes/Goals                    1. PO intake to meet at least


                                                 75% of nutritional needs.


                                                 2. Wt stability, skin to


                                                 remain intact, labs to


                                                 approach WNL.

## 2018-11-29 NOTE — DISCHARGE SUMMARY
DATE OF DISCHARGE:     11/28/2018



ADMITTING DIAGNOSES:

1.  Urinary tract infection.

2.  Gastroenteritis.

3.  Possibly acute on chronic renal insufficiency.

4.  Leukocytosis.

5.  Left lower extremity wound.



SECONDARY DIAGNOSES:

1.  History of intellectual disability

2.  History of essential hypertension.

3.  History of previous lower extremity deep venous thrombosis.

4.  History of depression/psych disorder.

5.  History of benign prostatic hypertrophy.



DISCHARGE DIAGNOSES:

1.  Escherichia coli urinary tract infection - clinically stable.

2.  Gastroenteritis - clinically resolved.

3.  Acute on chronic renal insufficiency - stable.

4.  Leukocytosis - resolved.

5.  Left lower extremity ulcer with Staphylococcus infection - clinically

     stable.

6.  Severe hydronephrosis secondary to urethral stricture, status post

     cystoscopy.

7.  Neurogenic bladder disease.



CONSULTANTS:  Dr. Cordero, Nephrology; Dr. Langford, Urology; Dr. Diogenes Ferguson,

Cardiology.



MAJOR PROCEDURES/DIAGNOSTICS:  

2D echo on 11/24 showed hypertrophy of the left ventricle with an EF of 60%.  

There was mild mitral and tricuspid regurgitation. 

 

On 11/27/2018, he underwent a cystoscopy showing the

prostatic urethra is wide open consistent with a previous TURP and the

gallbladder is trabeculated and very small capacity and has changes of

hemorrhagic cystitis and most likely hyperreflexic as it does not accommodate

more than  mL of fluid.  There were no complications.

  

On 11/22/2018, he underwent a renal ultrasound showing severe bilateral 
hydronephrosis.



BRIEF HOSPITAL COURSE:  This is a 73-year-old  male, who resides at

[facility] with history of severe intellectual disability,

depression, essential hypertension, BPH, history of lower extremity DVT, who was

brought in after he was noted to have 6 episodes of nausea and vomiting.  In the

ED, pertinent findings included a white count of 11.4, BUN and creatinine of

68/2.9, and a UA consistent with a UTI.  He was admitted to the medical floor

and placed on IV fluids, IV antibiotics, and he also underwent a renal workup. 

A renal consult was placed and an ultrasound of the kidneys showed severe

bilateral hydronephrosis.  After these results were obtained, Urology consult

was placed for possible cystoscopy and TURP.  Clinically speaking, patient did

improve after being admitted with improvement of his white count to a level of

8.9 by 11/27 and his renal function had improved to a level of BUN and

creatinine of 22/1.6.  His vital signs remained stable with no fevers recorded

and no further episodes of nausea and vomiting were reported by nursing staff.  

His microbiology came back with Staph aureus (non-MRSA) from his left lower 

extremity wound, and the urine culture also came back positive for E. coli (non-
ESBL).  

Blood cultures remained negative throughout his hospital stay.  

Given his comorbidities and need of further IV antibiotics, it was decided that 

the patient would benefit from long-term acute care.



MEDICATIONS ON DISCHARGE:  Tylenol 650 mg q. 6 p.r.n. for pain, atorvastatin 20

mg q. day, vitamin D3 at 1000 units q. day, ciprofloxacin 200 mg IV q. 24,

clindamycin 300 mg IV q. 8, Depakote  mg t.i.d., docusate sodium 100 mg

daily, Lexapro 10 mg daily, milk of magnesia 30 mL daily p.r.n. for

constipation, Lopressor 25 mg b.i.d., Zyprexa 5 mg at bedtime, NS at 100 mL per

hour, Ditropan 10 mg q. day, Protonix 40 mg q. day, Flomax 0.4 mg q. day.



DISPOSITION:  The patient was transferred to [facility] for further

management and care.





DD: 11/29/2018 11:33

DT: 11/29/2018 20:33

JOB# [id number]  [id number]

[id number]

## 2018-12-22 ENCOUNTER — HOSPITAL ENCOUNTER (INPATIENT)
Dept: HOSPITAL 36 - ER | Age: 73
LOS: 4 days | Discharge: NURSING, REHAB OR LONG-TERM CARE | DRG: 683 | End: 2018-12-26
Attending: INTERNAL MEDICINE | Admitting: INTERNAL MEDICINE
Payer: MEDICARE

## 2018-12-22 DIAGNOSIS — D64.9: ICD-10-CM

## 2018-12-22 DIAGNOSIS — N18.2: ICD-10-CM

## 2018-12-22 DIAGNOSIS — R11.2: ICD-10-CM

## 2018-12-22 DIAGNOSIS — K59.00: ICD-10-CM

## 2018-12-22 DIAGNOSIS — K56.7: ICD-10-CM

## 2018-12-22 DIAGNOSIS — N32.81: ICD-10-CM

## 2018-12-22 DIAGNOSIS — N17.9: Primary | ICD-10-CM

## 2018-12-22 DIAGNOSIS — K52.9: ICD-10-CM

## 2018-12-22 DIAGNOSIS — N13.6: ICD-10-CM

## 2018-12-22 DIAGNOSIS — Z88.8: ICD-10-CM

## 2018-12-22 DIAGNOSIS — Z86.718: ICD-10-CM

## 2018-12-22 DIAGNOSIS — F03.90: ICD-10-CM

## 2018-12-22 DIAGNOSIS — F72: ICD-10-CM

## 2018-12-22 DIAGNOSIS — Z86.73: ICD-10-CM

## 2018-12-22 DIAGNOSIS — I12.9: ICD-10-CM

## 2018-12-22 DIAGNOSIS — R13.10: ICD-10-CM

## 2018-12-22 DIAGNOSIS — F32.9: ICD-10-CM

## 2018-12-22 DIAGNOSIS — N40.0: ICD-10-CM

## 2018-12-22 LAB
ALBUMIN SERPL-MCNC: 3.5 GM/DL (ref 4.2–5.5)
ALBUMIN/GLOB SERPL: 1 {RATIO} (ref 1–1.8)
ALP SERPL-CCNC: 44 U/L (ref 34–104)
ALT SERPL-CCNC: 7 U/L (ref 7–52)
ANION GAP SERPL CALC-SCNC: 12.8 MMOL/L (ref 7–16)
AST SERPL-CCNC: 12 U/L (ref 13–39)
BASOPHILS # BLD AUTO: 0 TH/CUMM (ref 0–0.2)
BASOPHILS NFR BLD AUTO: 0.5 % (ref 0–2)
BILIRUB SERPL-MCNC: 0.3 MG/DL (ref 0.3–1)
BUN SERPL-MCNC: 21 MG/DL (ref 7–25)
CALCIUM SERPL-MCNC: 9.8 MG/DL (ref 8.6–10.3)
CHLORIDE SERPL-SCNC: 104 MEQ/L (ref 98–107)
CO2 SERPL-SCNC: 28.1 MEQ/L (ref 21–31)
CREAT SERPL-MCNC: 1.4 MG/DL (ref 0.7–1.3)
EOSINOPHIL # BLD AUTO: 0.5 TH/CMM (ref 0.1–0.4)
EOSINOPHIL NFR BLD AUTO: 6.8 % (ref 0–5)
ERYTHROCYTE [DISTWIDTH] IN BLOOD BY AUTOMATED COUNT: 12.7 % (ref 11.5–20)
GLOBULIN SER-MCNC: 3.7 GM/DL
GLUCOSE SERPL-MCNC: 126 MG/DL (ref 70–105)
HCT VFR BLD CALC: 32.6 % (ref 41–60)
HGB BLD-MCNC: 11 GM/DL (ref 12–16)
INR PPP: 1.3 (ref 0.5–1.4)
LYMPHOCYTE AB SER FC-ACNC: 1.4 TH/CMM (ref 1.5–3)
LYMPHOCYTES NFR BLD AUTO: 18.8 % (ref 20–50)
MCH RBC QN AUTO: 32.4 PG (ref 27–31)
MCHC RBC AUTO-ENTMCNC: 33.6 PG (ref 28–36)
MCV RBC AUTO: 96.3 FL (ref 80–99)
MONOCYTES # BLD AUTO: 0.6 TH/CMM (ref 0.3–1)
MONOCYTES NFR BLD AUTO: 7.9 % (ref 2–10)
NEUTROPHILS # BLD: 5 TH/CMM (ref 1.8–8)
NEUTROPHILS NFR BLD AUTO: 66 % (ref 40–80)
PCO2 BLDA: 41 MMHG (ref 35–45)
PLATELET # BLD: 269 TH/CMM (ref 150–400)
PMV BLD AUTO: 6.9 FL
PO2 BLDA: 68 MMHG (ref 80–100)
POTASSIUM SERPL-SCNC: 4.9 MEQ/L (ref 3.5–5.1)
PROTHROMBIN TIME: 13.3 SECONDS (ref 9.5–11.5)
RBC # BLD AUTO: 3.39 MIL/CMM (ref 3.8–5.8)
SAO2 % BLDA: 94 % (ref 92–100)
SODIUM SERPL-SCNC: 140 MEQ/L (ref 136–145)
WBC # BLD AUTO: 7.5 TH/CMM (ref 4.8–10.8)

## 2018-12-22 PROCEDURE — Z7610: HCPCS

## 2018-12-22 PROCEDURE — X3401: HCPCS

## 2018-12-22 PROCEDURE — A4217 STERILE WATER/SALINE, 500 ML: HCPCS

## 2018-12-22 RX ADMIN — DEXTROSE AND SODIUM CHLORIDE SCH MLS/HR: 5; .45 INJECTION, SOLUTION INTRAVENOUS at 23:39

## 2018-12-22 RX ADMIN — METOCLOPRAMIDE SCH MG: 5 INJECTION, SOLUTION INTRAMUSCULAR; INTRAVENOUS at 23:44

## 2018-12-22 NOTE — ED PHYSICIAN CHART
ED Chief Complaint/HPI





- Patient Information


Date Seen:: 12/22/18


Time Seen:: 19:20


Chief Complaint:: vomiting


History of Present Illness:: 





THIS IS A 74 YO MALE SENT FROM THE NURSING WITH A REPORT OF VOMITING. THIS 

PATIENT IS CHRONICALLY ILL WITH SEVERE INTELLECTUAL DISABILITIES,


HYPERTENSION, BNP, MAJOR DEPRESSIVE DISORDER.


Allergies:: 


 Allergies











Allergy/AdvReac Type Severity Reaction Status Date / Time


 


niacin Allergy   Verified 11/07/17 18:59











Vitals:: 


 Vital Signs - 8 hr











  12/22/18





  19:11


 


Temp 98.2 F


 


HR 86


 


RR 16


 


/76


 


O2 Sat % 94











Historian:: Medical Records


Review:: Transfer documents Reviewed





ED Review of Systems





- Review of Systems


General/Constitutional: No fever, No chills, No weight loss, No weakness, No 

diaphoresis, No edema, No loss of appetite, Other (THIS PATIENT IS UNABLE TO 

GIVE A REVIEW OF SYSTEMS.)


Skin: No skin lesions, No rash, No bruising


Head: No headache, No light-headedness


Eyes: No loss of vision, No pain, No diplopia


ENT: No earache, No nasal drainage, No sore throat, No tinnitus


Neck: No neck pain, No swelling, No thyromegaly, No stiffness, No mass noted


Cardio Vascular: No chest pain, No palpitations, No PND, No orthopnea, No edema


Pulmonary: No SOB, No cough, No sputum, No wheezing


GI: No nausea, No vomiting, No diarrhea, No pain, No melena, No hematochezia, 

No constipation, No hematemesis


G/U: No dysuria, No frequency, No hematuria


Musculoskeletal: No bone or joint pain, No back pain, No muscle pain


Endocrine: No polyuria, No polydipsia


Psychiatric: No prior psych history, No depression, No anxiety, No suicidal 

ideation


Hematopoietic: No bruising, No lymphadenopathy


Allergic/Immuno: No urticaria, No angioedema


Neurological: No syncope, No focal symptoms, No weakness, No paresthesia, No 

headache, No seizure, No dizziness, No confusion, No vertigo





ED Past Medical History





- Past Medical History


Obtainable: Yes


Past Medical History: HTN, CVA/TIA, Dementia


Family History: None


Social History: Non Smoker, No Alcohol, No Drug Use, Care Facility


Surgical History: None


Psychiatricy History: Schizophrenia, Dementia





Family Medical History





- Family Member


  ** Mother


History Unknown: Yes


Ethnicity: Unknown


Living Status: Unknown


Hx Family Cancer:  (UNKNOWN)


Hx Family Coronary Artery Disease:  (UNKNOWN)


Hx Family Congestive Heart Failure:  (UNKNOWN)


Hx Family Hypertension:  (UNKNOWN)


Hx Family Stroke:  (UNKNOWN)


Hx Family Diabetes:  (UNKNOWN)


Hx Family Seizures:  (UNKNOWN)


Hx Family Dementia:  (UNKNOWN)


Hx Family AIDS:  (UNKNOWN)


Hx Family COPD:  (UNKNOWN)


Hx Family Hepatitis:  (UNKNOWN)


Hx Family Psychiatric Problems:  (UNKNOWN)


Hx Family Tuberculosis:  (UNKNOWN)





ED Physical Exam





- Physical Examination


General/Constitutional: Awake, Well-developed, well-nourished, Alert, No 

distress, GCS 15, Non-toxic appearing, Ambulatory


Other Gen/Cons comments:: 





DISORIENTED TIMES FOUR,A CONFUSED


Head: Atraumatic


Eyes: Lids, conjuctiva normal, PERRL, EOMI


Skin: Nl inspection, No rash, No skin lesions, No ecchymosis, Well hydrated, No 

lymphadenopathy


ENMT: External ears, nose nl, Nasal exam nl, Lips, teeth, gums nl


Neck: Nontender, Full ROM w/o pain, No JVD, No nuchal rigidity, No bruit, No 

mass, No stridor


Respiratory: Nl effort/Exclusion, Clear to Auscultation, No Wheeze/Rhonchi/Rales


Cardio Vascular: RRR, No murmur, gallop, rubs, NL S1 S2


GI: No tenderness/rebounding/guarding, No organomegaly, No hernia, Normal BS's, 

Nondistended, No mass/bruits, No McBurney tenderness


: No CVA tenderness


Extremities: No tenderness or effusion, Full ROM, normal strength in all 

extremities, No edema, Normal digits & nails


Neuro/Psych: Alert/oriented, DTR's symmetric, Normal sensory exam, Normal motor 

strength, Judgement/insight normal, Mood normal, Normal gait, No focal deficits


Misc: Normal back, No paraspinal tenderness





ED Labs/Radiology/EKG Results





- Lab Results


Results: 


 Laboratory Tests











  12/22/18 12/22/18 12/22/18





  19:31 19:31 19:31


 


WBC    7.5


 


RBC    3.39 L


 


Hgb    11.0 L


 


Hct    32.6 L


 


MCV    96.3


 


MCH    32.4 H


 


MCHC Differential    33.6


 


RDW    12.7


 


Plt Count    269


 


MPV    6.9


 


Neutrophils %    66.0


 


Lymphocytes %    18.8 L


 


Monocytes %    7.9


 


Eosinophils %    6.8 H


 


Basophils %    0.5


 


PT   13.3 H 


 


INR   1.30 


 


PTT (Actin FS)   23.3 L 


 


Specimen Source   


 


Sample Site   


 


pH   


 


pCO2   


 


pO2   


 


HCO3   


 


Base Excess   


 


O2 Saturation   


 


Jaswant Test   


 


Vent Rate   


 


Inspired O2   


 


Tidal Volume   


 


PEEP   


 


Pressure (ins/psv/peep)   


 


Critical Value   


 


Sodium   


 


Potassium   


 


Chloride   


 


Carbon Dioxide   


 


Anion Gap   


 


BUN   


 


Creatinine   


 


Est GFR ( Amer)   


 


Est GFR (Non-Af Amer)   


 


BUN/Creatinine Ratio   


 


Glucose   


 


Calcium   


 


Total Bilirubin   


 


AST   


 


ALT   


 


Alkaline Phosphatase   


 


Troponin I  0.01  


 


Total Protein   


 


Albumin   


 


Globulin   


 


Albumin/Globulin Ratio   














  12/22/18 12/22/18





  19:31 19:50


 


WBC  


 


RBC  


 


Hgb  


 


Hct  


 


MCV  


 


MCH  


 


MCHC Differential  


 


RDW  


 


Plt Count  


 


MPV  


 


Neutrophils %  


 


Lymphocytes %  


 


Monocytes %  


 


Eosinophils %  


 


Basophils %  


 


PT  


 


INR  


 


PTT (Actin FS)  


 


Specimen Source   ARTERIAL


 


Sample Site   Right Radial


 


pH   7.45


 


pCO2   41.0


 


pO2   68.0 L


 


HCO3   28.0 H


 


Base Excess   4.1 H


 


O2 Saturation   94.0


 


Jaswant Test   Positive


 


Vent Rate   N/A


 


Inspired O2   21


 


Tidal Volume   N/A


 


PEEP   N/A


 


Pressure (ins/psv/peep)   N/A


 


Critical Value   MM,RCP


 


Sodium  140 


 


Potassium  4.9 


 


Chloride  104 


 


Carbon Dioxide  28.1 


 


Anion Gap  12.8 


 


BUN  21 


 


Creatinine  1.4 H 


 


Est GFR ( Amer)  TNP 


 


Est GFR (Non-Af Amer)  TNP 


 


BUN/Creatinine Ratio  15.0 


 


Glucose  126 H 


 


Calcium  9.8 


 


Total Bilirubin  0.3 


 


AST  12 L 


 


ALT  7 


 


Alkaline Phosphatase  44 


 


Troponin I  


 


Total Protein  7.2 


 


Albumin  3.5 L 


 


Globulin  3.7 


 


Albumin/Globulin Ratio  1.0 














- EKG Interpretations


EKG Time:: 20:08


Rate & Rhythm: RATE=73. SINUS


Axis: LEFT AXIS, NO ECTOPY SEEN





ED Assessment





- Assessment


General Assessment: 





PROTRACTED VOMITING





ED Septic Shock





- .


Is Septic Shock (SBP<90, OR Lactate>4 mmol\L) present?: No





- <6hrs of presentation:


Vital Signs: 


 Vital Signs - 8 hr











  12/22/18





  19:11


 


Temp 98.2 F


 


HR 86


 


RR 16


 


/76


 


O2 Sat % 94














ED Reassessment (Disposition)





- Reassessment


Reassessment Condition:: Unchanged





- Diagnosis


Diagnosis:: 





PROTRACTED VOMITING


DEMENTIA








- Patient Disposition


Discharge/Transfer:: Acute Care w/in this hosp


Admitting Medical Physician:: Carmina Ferrell

## 2018-12-23 VITALS — SYSTOLIC BLOOD PRESSURE: 150 MMHG | DIASTOLIC BLOOD PRESSURE: 74 MMHG

## 2018-12-23 LAB
ALBUMIN SERPL-MCNC: 3.3 GM/DL (ref 4.2–5.5)
ALBUMIN/GLOB SERPL: 1 {RATIO} (ref 1–1.8)
ALP SERPL-CCNC: 40 U/L (ref 34–104)
ALT SERPL-CCNC: 7 U/L (ref 7–52)
ANION GAP SERPL CALC-SCNC: 9.5 MMOL/L (ref 7–16)
APPEARANCE UR: (no result)
AST SERPL-CCNC: 12 U/L (ref 13–39)
BACTERIA #/AREA URNS HPF: (no result) /HPF
BASOPHILS # BLD AUTO: 0.1 TH/CUMM (ref 0–0.2)
BASOPHILS NFR BLD AUTO: 1.2 % (ref 0–2)
BILIRUB SERPL-MCNC: 0.3 MG/DL (ref 0.3–1)
BILIRUB UR-MCNC: NEGATIVE MG/DL
BUN SERPL-MCNC: 19 MG/DL (ref 7–25)
CALCIUM SERPL-MCNC: 9.6 MG/DL (ref 8.6–10.3)
CHLORIDE SERPL-SCNC: 106 MEQ/L (ref 98–107)
CO2 SERPL-SCNC: 30.1 MEQ/L (ref 21–31)
COLOR UR: YELLOW
CREAT SERPL-MCNC: 1.4 MG/DL (ref 0.7–1.3)
EOSINOPHIL # BLD AUTO: 0.6 TH/CMM (ref 0.1–0.4)
EOSINOPHIL NFR BLD AUTO: 8 % (ref 0–5)
EPI CELLS URNS QL MICRO: (no result) /LPF
ERYTHROCYTE [DISTWIDTH] IN BLOOD BY AUTOMATED COUNT: 12.2 % (ref 11.5–20)
GLOBULIN SER-MCNC: 3.4 GM/DL
GLUCOSE SERPL-MCNC: 107 MG/DL (ref 70–105)
GLUCOSE UR STRIP-MCNC: NEGATIVE MG/DL
HCT VFR BLD CALC: 32.7 % (ref 41–60)
HGB BLD-MCNC: 11 GM/DL (ref 12–16)
KETONES UR STRIP-MCNC: NEGATIVE MG/DL
LEUKOCYTE ESTERASE UR-ACNC: (no result)
LYMPHOCYTE AB SER FC-ACNC: 2.2 TH/CMM (ref 1.5–3)
LYMPHOCYTES NFR BLD AUTO: 29.9 % (ref 20–50)
MAGNESIUM SERPL-MCNC: 2.1 MG/DL (ref 1.9–2.7)
MCH RBC QN AUTO: 32.7 PG (ref 27–31)
MCHC RBC AUTO-ENTMCNC: 33.6 PG (ref 28–36)
MCV RBC AUTO: 97.4 FL (ref 80–99)
MICRO URNS: YES
MONOCYTES # BLD AUTO: 0.5 TH/CMM (ref 0.3–1)
MONOCYTES NFR BLD AUTO: 7 % (ref 2–10)
NEUTROPHILS # BLD: 4.1 TH/CMM (ref 1.8–8)
NEUTROPHILS NFR BLD AUTO: 53.9 % (ref 40–80)
NITRITE UR QL STRIP: NEGATIVE
PH UR STRIP: 6.5 [PH] (ref 4.6–8)
PLATELET # BLD: 277 TH/CMM (ref 150–400)
PMV BLD AUTO: 7.3 FL
POTASSIUM SERPL-SCNC: 4.6 MEQ/L (ref 3.5–5.1)
PROT UR STRIP-MCNC: 30 MG/DL
RBC # BLD AUTO: 3.35 MIL/CMM (ref 3.8–5.8)
RBC # UR STRIP: (no result) /UL
SODIUM SERPL-SCNC: 141 MEQ/L (ref 136–145)
SP GR UR STRIP: 1.01 (ref 1–1.03)
URINALYSIS COMPLETE PNL UR: (no result)
UROBILINOGEN UR STRIP-ACNC: 0.2 E.U./DL (ref 0.2–1)
WBC # BLD AUTO: 7.5 TH/CMM (ref 4.8–10.8)
WBC #/AREA URNS HPF: (no result) /HPF (ref 0–5)

## 2018-12-23 RX ADMIN — DEXTROSE AND SODIUM CHLORIDE SCH MLS/HR: 5; .45 INJECTION, SOLUTION INTRAVENOUS at 20:14

## 2018-12-23 RX ADMIN — METOCLOPRAMIDE SCH MG: 5 INJECTION, SOLUTION INTRAMUSCULAR; INTRAVENOUS at 05:16

## 2018-12-23 RX ADMIN — FENOFIBRATE SCH MG: 134 CAPSULE ORAL at 20:13

## 2018-12-23 RX ADMIN — METOCLOPRAMIDE SCH MG: 5 INJECTION, SOLUTION INTRAMUSCULAR; INTRAVENOUS at 21:20

## 2018-12-23 RX ADMIN — Medication SCH MG: at 16:32

## 2018-12-23 RX ADMIN — DEXTROSE AND SODIUM CHLORIDE SCH MLS/HR: 5; .45 INJECTION, SOLUTION INTRAVENOUS at 09:39

## 2018-12-23 RX ADMIN — METOCLOPRAMIDE SCH MG: 5 INJECTION, SOLUTION INTRAMUSCULAR; INTRAVENOUS at 14:32

## 2018-12-23 RX ADMIN — Medication SCH TAB: at 14:32

## 2018-12-23 NOTE — HISTORY & PHYSICAL
ADMIT DATE:     12/23/2018



CHIEF COMPLAINT:  Persistent nausea and vomiting.



HISTORY OF PRESENT ILLNESS:  The patient is a 73-year-old  gentleman, a

resident of [facility] who was recently admitted here for similar 
symptoms

and was eventually diagnosed with UTI, acute gastroenteritis, acute on chronic

renal insufficiency, hydronephrosis, and hyperreactive urinary bladder.  He

eventually was transferred to [facility] where he finished his

antibiotic course and was discharged a few days ago back to [facility].  He

presented to this facility again with a 1-day history of persistent nausea and

vomiting, but no diarrhea or abdominal pain.  He was given IV Zofran, but

patient kept having nausea and vomiting and has been electively admitted for

further management and care.  At this time, he appears to be comfortable and

there have been no further episodes of above-mentioned complaint since been 
admitted.



PAST MEDICAL HISTORY:  Severe intellectual disability, severe hydronephrosis,

history of BPH, status post TURP, essential hypertension, history of previous

lower extremity DVT, history of depression, psych disorder, history of left

lower extremity wound infection, status post treatment, history of recent

cystoscopy with hyperreactive urinary bladder.



PAST SURGICAL HISTORY:  As noted above.



FAMILY HISTORY:  Likely noncontributory to this admission.



SOCIAL HISTORY:  Not able to address, refuses usage.  He lives at a group home.



ALLERGIES:  NIACIN.



OUTPATIENT MEDICATIONS:  Tylenol q.4 hours p.r.n. for fever, pain, Dulcolax 10

mg q. 96 hours p.r.n. for severe constipation, calcium carbonate 500 mg b.i.d.,

vitamin D 1000 international units every day, Dexlansoprazole 60 every day,

Depakote 500 mg t.i.d., docusate sodium 100 every day, escitalopram 10 mg every

day, fenofibrate 120 q.h.s., Proscar 5 mg q.h.s., omega-3 1000 b.i.d., Fleet

enema q. 96 hours p.r.n. for severe pain, metoprolol 25 b.i.d., multivitamins

every day, Zyprexa 2.5 at bedtime, oxybutynin 2 tabs every day, ramipril 10 mg

b.i.d., Xarelto 20 mg daily, Flomax 0.4.



REVIEW OF SYSTEMS:  Unable to be done given patient's condition.



PHYSICAL EXAMINATION:

VITAL SIGNS:  Temperature 96.2, pulse 72, respirations 18, /68, satting

97-98% on room air.

GENERAL:  He is awake and able to follow simple commands.  He is developmentally

delayed. 

HEAD AND NECK:  Normocephalic, atraumatic.  Pupils reactive to light. 

Extraocular movements are intact.  Oropharynx moist and clear.

CARDIOVASCULAR:  Regular rate and rhythm without any murmurs.

LUNGS:  Clear to auscultation bilaterally.

ABDOMEN:  Soft, supple. There is mild tenderness to palpation on the epigastric

area, but rebound, no peritoneal signs. 

EXTREMITIES:  Lower extremities, there is no edema.

NEUROLOGIC:  A full exam was not able to be done given patient's condition, but

appears to be nonfocal.



LABORATORY DATA:  White count 7.5, H and H 11/32, platelets 269.  ABG:  pH 7.45,

pCO2 of 41, pO2 of 68.  Chemistry was within normal limits except for the

creatinine 1.4. 

UA showed large blood, negative nitrite, small leukocyte esterase, 10-25 wbc's,

1+ bacteria.  KUB shows mildly distended stool filled large bowel. 

Chest x-ray:

1.  No definite acute focal processes. 

2.  Cardiomegaly.



DIAGNOSTICS:  EKG shows sinus rhythm at a rate of 73.



IMPRESSION:

1.  Protracted nausea, vomiting. 

2.  Urinary tract infection.

3.  Constipation.

4.  Mild leukocytosis.

5.  Chronic renal insufficiency, secondary to hyperactive bladder.

6.  History of severe hydronephrosis 2ry to hyperactive bladder.

7.  History of depression, psych disorder.

8.  History of essential hypertension.

9.  History of lower extremity deep venous thrombosis.



PLAN:  The patient has been admitted to the medical floor where he has been

placed on IV fluids and supportive care including Zofran and Reglan.  He also

has been started on Rocephin for the urinary tract infection and will be given

lactulose for constipation.  He will be kept on his other medications including

his bowel regimen and a Fleet Enema will be asked for if no improvement of his

constipation.  We will follow labs as well as cultures.





DD: 12/23/2018 13:55

DT: 12/23/2018 15:00

JOB# [id number]  [id number]

[facility]

## 2018-12-23 NOTE — DIAGNOSTIC IMAGING REPORT
Portable chest x-ray



HISTORY: Vomiting



There is a poor inspiration.  The heart appears somewhat enlarged.  No

definite acute focal bony parenchymal processes.



IMPRESSION:

1.  Allowing for a poor inspiration, no definite acute focal processes

2.  Cardiomegaly

## 2018-12-23 NOTE — DIAGNOSTIC IMAGING REPORT
KUB abdominal film



HISTORY: Vomiting



The exam demonstrates stool-filled mildly distended colon.  Bowel gas

pattern otherwise nonspecific.  No free intraperitoneal air.



IMPRESSION:

1.  Mildly distended stool-filled large bowel.  Findings may be

associated with a degree of constipation.  No other acute abnormalities.

## 2018-12-24 LAB
ANION GAP SERPL CALC-SCNC: 10.6 MMOL/L (ref 7–16)
BASOPHILS # BLD AUTO: 0 TH/CUMM (ref 0–0.2)
BASOPHILS NFR BLD AUTO: 0.7 % (ref 0–2)
BUN SERPL-MCNC: 19 MG/DL (ref 7–25)
CALCIUM SERPL-MCNC: 9.3 MG/DL (ref 8.6–10.3)
CHLORIDE SERPL-SCNC: 105 MEQ/L (ref 98–107)
CO2 SERPL-SCNC: 29.3 MEQ/L (ref 21–31)
CREAT SERPL-MCNC: 1.5 MG/DL (ref 0.7–1.3)
EOSINOPHIL # BLD AUTO: 0.5 TH/CMM (ref 0.1–0.4)
EOSINOPHIL NFR BLD AUTO: 8.8 % (ref 0–5)
ERYTHROCYTE [DISTWIDTH] IN BLOOD BY AUTOMATED COUNT: 12.7 % (ref 11.5–20)
GLUCOSE SERPL-MCNC: 100 MG/DL (ref 70–105)
HCT VFR BLD CALC: 31.5 % (ref 41–60)
HGB BLD-MCNC: 10.5 GM/DL (ref 12–16)
LYMPHOCYTE AB SER FC-ACNC: 1.6 TH/CMM (ref 1.5–3)
LYMPHOCYTES NFR BLD AUTO: 26.5 % (ref 20–50)
MAGNESIUM SERPL-MCNC: 2 MG/DL (ref 1.9–2.7)
MCH RBC QN AUTO: 32.1 PG (ref 27–31)
MCHC RBC AUTO-ENTMCNC: 33.4 PG (ref 28–36)
MCV RBC AUTO: 96 FL (ref 80–99)
MONOCYTES # BLD AUTO: 0.5 TH/CMM (ref 0.3–1)
MONOCYTES NFR BLD AUTO: 7.8 % (ref 2–10)
NEUTROPHILS # BLD: 3.6 TH/CMM (ref 1.8–8)
NEUTROPHILS NFR BLD AUTO: 56.2 % (ref 40–80)
PLATELET # BLD: 244 TH/CMM (ref 150–400)
PMV BLD AUTO: 6.7 FL
POTASSIUM SERPL-SCNC: 4.9 MEQ/L (ref 3.5–5.1)
RBC # BLD AUTO: 3.28 MIL/CMM (ref 3.8–5.8)
SODIUM SERPL-SCNC: 140 MEQ/L (ref 136–145)
WBC # BLD AUTO: 6.2 TH/CMM (ref 4.8–10.8)

## 2018-12-24 RX ADMIN — METOCLOPRAMIDE SCH MG: 5 INJECTION, SOLUTION INTRAMUSCULAR; INTRAVENOUS at 05:44

## 2018-12-24 RX ADMIN — DEXTROSE AND SODIUM CHLORIDE SCH MLS/HR: 5; .45 INJECTION, SOLUTION INTRAVENOUS at 05:43

## 2018-12-24 RX ADMIN — FENOFIBRATE SCH: 134 CAPSULE ORAL at 22:05

## 2018-12-24 RX ADMIN — Medication SCH TAB: at 09:07

## 2018-12-24 RX ADMIN — DEXTROSE AND SODIUM CHLORIDE SCH MLS/HR: 5; .45 INJECTION, SOLUTION INTRAVENOUS at 16:00

## 2018-12-24 RX ADMIN — Medication SCH: at 17:00

## 2018-12-24 RX ADMIN — PANTOPRAZOLE SODIUM SCH MG: 40 TABLET, DELAYED RELEASE ORAL at 09:07

## 2018-12-24 RX ADMIN — Medication SCH MG: at 09:05

## 2018-12-24 RX ADMIN — METOCLOPRAMIDE SCH: 5 INJECTION, SOLUTION INTRAMUSCULAR; INTRAVENOUS at 14:15

## 2018-12-24 RX ADMIN — METOCLOPRAMIDE SCH MG: 5 INJECTION, SOLUTION INTRAMUSCULAR; INTRAVENOUS at 22:04

## 2018-12-24 NOTE — GENERAL PROGRESS NOTE
Subjective





- Review of Systems


Service Date: 12/24/18


Subjective: 


Confused





Objective





- Results


Result Diagrams: 


 12/24/18 05:30





 12/24/18 05:30


Recent Labs: 


 Laboratory Last Values











WBC  6.2 Th/cmm (4.8-10.8)   12/24/18  05:30    


 


RBC  3.28 Mil/cmm (3.80-5.80)  L  12/24/18  05:30    


 


Hgb  10.5 gm/dL (12-16)  L  12/24/18  05:30    


 


Hct  31.5 % (41.0-60)  L  12/24/18  05:30    


 


MCV  96.0 fl (80-99)   12/24/18  05:30    


 


MCH  32.1 pg (27.0-31.0)  H  12/24/18  05:30    


 


MCHC Differential  33.4 pg (28.0-36.0)   12/24/18  05:30    


 


RDW  12.7 % (11.5-20.0)   12/24/18  05:30    


 


Plt Count  244 Th/cmm (150-400)   12/24/18  05:30    


 


MPV  6.7 fl  12/24/18  05:30    


 


Neutrophils %  56.2 % (40.0-80.0)   12/24/18  05:30    


 


Lymphocytes %  26.5 % (20.0-50.0)   12/24/18  05:30    


 


Monocytes %  7.8 % (2.0-10.0)   12/24/18  05:30    


 


Eosinophils %  8.8 % (0.0-5.0)  H  12/24/18  05:30    


 


Basophils %  0.7 % (0.0-2.0)   12/24/18  05:30    


 


PT  13.3 SECONDS (9.5-11.5)  H  12/22/18  19:31    


 


INR  1.30  (0.5-1.4)   12/22/18  19:31    


 


PTT (Actin FS)  23.3 SECONDS (26.0-38.0)  L  12/22/18  19:31    


 


Specimen Source  ARTERIAL   12/22/18  19:50    


 


Sample Site  Right Radial   12/22/18  19:50    


 


pH  7.45  (7.35-7.45)   12/22/18  19:50    


 


pCO2  41.0 mmHg (35.0-45.0)   12/22/18  19:50    


 


pO2  68.0 mmHg (80.0-100.0)  L  12/22/18  19:50    


 


HCO3  28.0 mEq/L (20.0-26.0)  H  12/22/18  19:50    


 


Base Excess  4.1 mEq/L (-3.0-3.0)  H  12/22/18  19:50    


 


O2 Saturation  94.0 % (92.0-100.0)   12/22/18  19:50    


 


Jaswant Test  Positive   12/22/18  19:50    


 


Vent Rate  N/A   12/22/18  19:50    


 


Inspired O2  21   12/22/18  19:50    


 


Tidal Volume  N/A   12/22/18  19:50    


 


PEEP  N/A   12/22/18  19:50    


 


Pressure (ins/psv/peep)  N/A   12/22/18  19:50    


 


Critical Value  MM,RCP   12/22/18  19:50    


 


Sodium  140 mEq/L (136-145)   12/24/18  05:30    


 


Potassium  4.9 mEq/L (3.5-5.1)   12/24/18  05:30    


 


Chloride  105 mEq/L ()   12/24/18  05:30    


 


Carbon Dioxide  29.3 mEq/L (21.0-31.0)   12/24/18  05:30    


 


Anion Gap  10.6  (7.0-16.0)   12/24/18  05:30    


 


BUN  19 mg/dL (7-25)   12/24/18  05:30    


 


Creatinine  1.5 mg/dL (0.7-1.3)  H  12/24/18  05:30    


 


Est GFR ( Amer)  TNP   12/24/18  05:30    


 


Est GFR (Non-Af Amer)  TNP   12/24/18  05:30    


 


BUN/Creatinine Ratio  12.7   12/24/18  05:30    


 


Glucose  100 mg/dL ()   12/24/18  05:30    


 


POC Glucose  95 MG/DL (70 - 105)   12/22/18  23:47    


 


Calcium  9.3 mg/dL (8.6-10.3)   12/24/18  05:30    


 


Magnesium  2.0 mg/dL (1.9-2.7)   12/24/18  05:30    


 


Total Bilirubin  0.3 mg/dL (0.3-1.0)   12/23/18  04:38    


 


AST  12 U/L (13-39)  L  12/23/18  04:38    


 


ALT  7 U/L (7-52)   12/23/18  04:38    


 


Alkaline Phosphatase  40 U/L ()   12/23/18  04:38    


 


Troponin I  0.01 ng/mL (0.01-0.05)   12/22/18  19:31    


 


Total Protein  6.7 gm/dL (6.0-8.3)   12/23/18  04:38    


 


Albumin  3.3 gm/dL (4.2-5.5)  L  12/23/18  04:38    


 


Globulin  3.4 gm/dL  12/23/18  04:38    


 


Albumin/Globulin Ratio  1.0  (1.0-1.8)   12/23/18  04:38    


 


TSH  8.11 uIU/ml (0.34-5.60)  H  12/23/18  04:38    


 


Urine Source  CLEAN C   12/23/18  06:00    


 


Urine Color  YELLOW   12/23/18  06:00    


 


Urine Clarity  TURBID  (CLEAR)   12/23/18  06:00    


 


Urine pH  6.5  (4.6 - 8.0)   12/23/18  06:00    


 


Ur Specific Gravity  1.015  (1.005-1.030)   12/23/18  06:00    


 


Urine Protein  30 mg/dL (NEGATIVE)  H  12/23/18  06:00    


 


Urine Glucose (UA)  NEGATIVE mg/dL (NEGATIVE)   12/23/18  06:00    


 


Urine Ketones  NEGATIVE mg/dL (NEGATIVE)   12/23/18  06:00    


 


Urine Blood  LARGE  (NEGATIVE)  H  12/23/18  06:00    


 


Urine Nitrate  NEGATIVE  (NEGATIVE)   12/23/18  06:00    


 


Urine Bilirubin  NEGATIVE  (NEGATIVE)   12/23/18  06:00    


 


Urine Urobilinogen  0.2 E.U./dL (0.2 - 1.0)   12/23/18  06:00    


 


Ur Leukocyte Esterase  SMALL  (NEGATIVE)  H  12/23/18  06:00    


 


Urine RBC  10-25 /hpf (0-5)  H  12/23/18  06:00    


 


Urine WBC  6-10 /hpf (0-5)   12/23/18  06:00    


 


Ur Epithelial Cells  OCCASIONAL /lpf (FEW)   12/23/18  06:00    


 


Urine Bacteria  1+ /hpf (NONE SEEN)  H  12/23/18  06:00    














- Physical Exam


Vitals and I&O: 


 Vital Signs











Temp  97.8 F   12/24/18 15:00


 


Pulse  55   12/24/18 18:08


 


Resp  15   12/24/18 17:24


 


BP  98/48   12/24/18 18:08


 


Pulse Ox  100   12/24/18 17:24








 Intake & Output











 12/23/18 12/24/18 12/24/18





 18:59 06:59 18:59


 


Intake Total 2362.751 1904.333 120


 


Balance 0826.050 6599.333 120


 


Weight (lbs) 66.451 kg  67.404 kg


 


Intake:   


 


  Intake, IV Amount 479.608 6919.333 


 


    D5-0.45NS 1,000 ml @ 100 671.103 1340.333 





    mls/hr IV .Q10H [facility] Rx#:   





    [id number]   


 


    cefTRIAXone 1 gm In 50  





    Sodium Chloride 0.9% 50   





    ml @ 100 mls/hr IV Q24HR   





    [facility] Rx#:[id number]   


 


  Oral 1050  120


 


Other:   


 


  # Voids 2  1


 


  # Bowel Movements 1  0


 


  Weight Source Bedscale  Bedscale











Active Medications: 


Current Medications





Acetaminophen (Tylenol)  325 mg PO Q4HR PRN


   PRN Reason: Pain or Fever >101


   Stop: 02/21/19 13:11


Bisacodyl (Dulcolax 10 Mg Supp)  10 mg RC Q96H PRN


   PRN Reason: Constipation


   Stop: 02/21/19 13:11


Calcium Carbonate (Os-Olivier)  500 mg PO BID [facility]


   Stop: 02/21/19 16:59


   Last Admin: 12/24/18 17:00 Dose:  Not Given


Cholecalciferol (Vitamin D3)  1,000 iu PO DAILY [facility]


   Stop: 02/21/19 13:14


   Last Admin: 12/24/18 09:05 Dose:  1,000 iu


Divalproex Sodium (Depakote Dr)  500 mg PO TID [facility]; Protocol


   Stop: 02/21/19 13:59


   Last Admin: 12/24/18 09:05 Dose:  500 mg


Docusate Sodium (Colace)  100 mg PO DAILY [facility]


   Stop: 02/22/19 08:59


   Last Admin: 12/24/18 09:07 Dose:  100 mg


Escitalopram Oxalate (Lexapro)  10 mg PO DAILY [facility]; Protocol


   Stop: 02/21/19 13:14


   Last Admin: 12/24/18 09:07 Dose:  10 mg


Fenofibrate (Tricor)  134 mg PO HS [facility]


   Stop: 02/21/19 20:59


   Last Admin: 12/23/18 20:13 Dose:  134 mg


Finasteride (Proscar)  5 mg PO HS [facility]; Protocol


   Stop: 02/21/19 20:59


   Last Admin: 12/23/18 20:13 Dose:  5 mg


Fish Oil (Omega 3)  1,000 mg PO BID [facility]


   Stop: 02/21/19 16:59


   Last Admin: 12/24/18 17:00 Dose:  Not Given


Dextrose/Sodium Chloride (D5-0.45ns)  1,000 mls @ 100 mls/hr IV .Q10H [facility]


   Stop: 02/20/19 22:06


   Last Admin: 12/24/18 05:43 Dose:  100 mls/hr


Ceftriaxone Sodium 1 gm/ (Sodium Chloride)  50 mls @ 100 mls/hr IV Q24HR [facility]


   Stop: 02/21/19 13:59


   Last Infusion: 12/23/18 15:05 Dose:  Infused


Lactulose (Cephulac)  30 gm PO DAILY PRN


   PRN Reason: Constipation


   Stop: 02/21/19 13:17


Magnesium Hydroxide (Milk Of Magnesia)  30 ml PO Q72H PRN


   PRN Reason: Constipation


   Stop: 02/21/19 13:11


Metoclopramide HCl (Reglan)  10 mg IVP Q8H [facility]


   Stop: 02/20/19 22:14


   Last Admin: 12/24/18 14:15 Dose:  Not Given


Metoprolol Tartrate (Lopressor)  25 mg PO BID [facility]


   Stop: 02/21/19 16:59


   Last Admin: 12/24/18 18:08 Dose:  Not Given


Miscellaneous (Solifenacin Succinate [Vesicare])  5 mg PO QPM [facility]


   Stop: 02/21/19 16:59


Olanzapine (Zyprexa)  2.5 mg PO HS [facility]; Protocol


   Stop: 02/21/19 20:59


   Last Admin: 12/23/18 20:13 Dose:  2.5 mg


Ondansetron HCl (Zofran)  4 mg IV Q4H PRN


   PRN Reason: Nausea


   Stop: 02/20/19 22:14


Oxybutynin Chloride (Ditropan)  10 mg PO DAILY [facility]


   Stop: 02/22/19 08:59


   Last Admin: 12/24/18 09:04 Dose:  10 mg


Pantoprazole Sodium (Protonix)  40 mg PO DAILY [facility]


   Stop: 02/22/19 08:59


   Last Admin: 12/24/18 09:07 Dose:  40 mg


Ramipril (Altace)  10 mg PO BID [facility]


   Stop: 02/21/19 16:59


   Last Admin: 12/24/18 17:00 Dose:  Not Given


Rivaroxaban (Xarelto)  20 mg PO DAILY [facility]


   Stop: 02/22/19 08:59


   Last Admin: 12/24/18 09:07 Dose:  20 mg


Sodium Phosphate (Fleet Enema)  135 ml RC Q96H PRN


   PRN Reason: Constipation


   Stop: 02/21/19 13:11


Tamsulosin HCl (Flomax)  0.4 mg PO HS [facility]


   Stop: 02/21/19 20:59


   Last Admin: 12/23/18 20:13 Dose:  0.4 mg








General: Other (Confused)


HEENT: Atraumatic, PERRLA


Neck: Supple


Cardiovascular: Regular rate


Lungs: Clear to auscultation


Abdomen: Bowel sounds, Soft


Extremities: Other (No edema)


Neurological: Other (Non ambulatory)


Skin: Other (Warm and dry)


Psych/Mental Status: Other (Confused, not oriented)





- Procedures


Procedures: 


 Procedures











Procedure Code Date


 


C & S-INTEGUMENT 91.63 05/25/95


 


CULTURE OTHR SPECIMN AEROBIC 07545 05/25/95


 


DILATION OF URETHRA, ENDO [id number] 11/21/18


 


DX ULTRASOUND-VASCULAR 88.77 12/28/95


 


ELECTROCARDIOGRAPH MONIT 89.54 12/28/95


 


INJECT ANTIBIOTIC 99.21 10/09/95


 


INJECT ANTICOAGULANT 99.19 05/25/95


 


MICROBE SUSCEPTIBLE DISK 07765 05/25/95


 


OTHER C.A.T. SCAN 88.38 12/28/95


 


URINARY SYSTEM X-RAY NEC 87.79 05/25/95


 


WHIRLPOOL THERAPY 67204 05/25/95


 


WHIRLPOOL TREATMENT 93.32 05/25/95














Assessment/Plan





- Assessment


Assessment: 


patient is sleeping but arousable, he is in Nasal O2, /60, HR 60, he was 

transferred to ICU sdue that he had an episode of SOB, secondary to a big piece 

of chicken stuck in throat.  Now is stable breathing well. Dx: Protacted Nausea 

and vomit, UTI, CKD, HTN, CVA/TIA, BPH, Hydronephrosis, Mentally challenge, 

Dementia.   





- Plan


Plan: 


Patient is in [facility] for right now, IV NS, IV AB, Nasal O2, Continue with SNF 

meds. He already received IV  NS bolus and BP improved. Will continue to 

monitor.

## 2018-12-24 NOTE — GENERAL PROGRESS NOTE
Subjective





- Review of Systems


Service Date: 12/24/18


Events since last encounter: 





ER CONSULT





REASON FOR CONSULTATION :   ACUTE RESPIRATORY DISTRESS





 I WAS CALLED TO SEE A PATIENT IN MED/SURGERY IN ACUTE RESPIRATORY DISTRESS AND 

WAS BEING ASSISTED WITH BY BAG WITH 100% O2, 


WITH A FAST HEART RATE.  THIS EVENT STARTED SUDDENLY WHILE HE WAS EATING.    





A LARGE PIECE OF CHICKEN WAS REMOVED FROM THE TRACHEA WITH [facility].   

THE SYMPTOMS ABATED AND THE PATIENT BEGAN TO BREATH 


NORMALLY AND TRANSFERRED TO ICU FOR OBSERVATION.





NO ANESTHETIC WAS USED.





Objective





- Results


Result Diagrams: 


 12/24/18 05:30





 12/24/18 05:30


Recent Labs: 


 Laboratory Last Values











WBC  6.2 Th/cmm (4.8-10.8)   12/24/18  05:30    


 


RBC  3.28 Mil/cmm (3.80-5.80)  L  12/24/18  05:30    


 


Hgb  10.5 gm/dL (12-16)  L  12/24/18  05:30    


 


Hct  31.5 % (41.0-60)  L  12/24/18  05:30    


 


MCV  96.0 fl (80-99)   12/24/18  05:30    


 


MCH  32.1 pg (27.0-31.0)  H  12/24/18  05:30    


 


MCHC Differential  33.4 pg (28.0-36.0)   12/24/18  05:30    


 


RDW  12.7 % (11.5-20.0)   12/24/18  05:30    


 


Plt Count  244 Th/cmm (150-400)   12/24/18  05:30    


 


MPV  6.7 fl  12/24/18  05:30    


 


Neutrophils %  56.2 % (40.0-80.0)   12/24/18  05:30    


 


Lymphocytes %  26.5 % (20.0-50.0)   12/24/18  05:30    


 


Monocytes %  7.8 % (2.0-10.0)   12/24/18  05:30    


 


Eosinophils %  8.8 % (0.0-5.0)  H  12/24/18  05:30    


 


Basophils %  0.7 % (0.0-2.0)   12/24/18  05:30    


 


PT  13.3 SECONDS (9.5-11.5)  H  12/22/18  19:31    


 


INR  1.30  (0.5-1.4)   12/22/18  19:31    


 


PTT (Actin FS)  23.3 SECONDS (26.0-38.0)  L  12/22/18  19:31    


 


Specimen Source  ARTERIAL   12/22/18  19:50    


 


Sample Site  Right Radial   12/22/18  19:50    


 


pH  7.45  (7.35-7.45)   12/22/18  19:50    


 


pCO2  41.0 mmHg (35.0-45.0)   12/22/18  19:50    


 


pO2  68.0 mmHg (80.0-100.0)  L  12/22/18  19:50    


 


HCO3  28.0 mEq/L (20.0-26.0)  H  12/22/18  19:50    


 


Base Excess  4.1 mEq/L (-3.0-3.0)  H  12/22/18  19:50    


 


O2 Saturation  94.0 % (92.0-100.0)   12/22/18  19:50    


 


Jaswant Test  Positive   12/22/18  19:50    


 


Vent Rate  N/A   12/22/18  19:50    


 


Inspired O2  21   12/22/18  19:50    


 


Tidal Volume  N/A   12/22/18  19:50    


 


PEEP  N/A   12/22/18  19:50    


 


Pressure (ins/psv/peep)  N/A   12/22/18  19:50    


 


Critical Value  MM,RCP   12/22/18  19:50    


 


Sodium  140 mEq/L (136-145)   12/24/18  05:30    


 


Potassium  4.9 mEq/L (3.5-5.1)   12/24/18  05:30    


 


Chloride  105 mEq/L ()   12/24/18  05:30    


 


Carbon Dioxide  29.3 mEq/L (21.0-31.0)   12/24/18  05:30    


 


Anion Gap  10.6  (7.0-16.0)   12/24/18  05:30    


 


BUN  19 mg/dL (7-25)   12/24/18  05:30    


 


Creatinine  1.5 mg/dL (0.7-1.3)  H  12/24/18  05:30    


 


Est GFR ( Amer)  TNP   12/24/18  05:30    


 


Est GFR (Non-Af Amer)  TNP   12/24/18  05:30    


 


BUN/Creatinine Ratio  12.7   12/24/18  05:30    


 


Glucose  100 mg/dL ()   12/24/18  05:30    


 


POC Glucose  95 MG/DL (70 - 105)   12/22/18  23:47    


 


Calcium  9.3 mg/dL (8.6-10.3)   12/24/18  05:30    


 


Magnesium  2.0 mg/dL (1.9-2.7)   12/24/18  05:30    


 


Total Bilirubin  0.3 mg/dL (0.3-1.0)   12/23/18  04:38    


 


AST  12 U/L (13-39)  L  12/23/18  04:38    


 


ALT  7 U/L (7-52)   12/23/18  04:38    


 


Alkaline Phosphatase  40 U/L ()   12/23/18  04:38    


 


Troponin I  0.01 ng/mL (0.01-0.05)   12/22/18  19:31    


 


Total Protein  6.7 gm/dL (6.0-8.3)   12/23/18  04:38    


 


Albumin  3.3 gm/dL (4.2-5.5)  L  12/23/18  04:38    


 


Globulin  3.4 gm/dL  12/23/18  04:38    


 


Albumin/Globulin Ratio  1.0  (1.0-1.8)   12/23/18  04:38    


 


TSH  8.11 uIU/ml (0.34-5.60)  H  12/23/18  04:38    


 


Urine Source  CLEAN C   12/23/18  06:00    


 


Urine Color  YELLOW   12/23/18  06:00    


 


Urine Clarity  TURBID  (CLEAR)   12/23/18  06:00    


 


Urine pH  6.5  (4.6 - 8.0)   12/23/18  06:00    


 


Ur Specific Gravity  1.015  (1.005-1.030)   12/23/18  06:00    


 


Urine Protein  30 mg/dL (NEGATIVE)  H  12/23/18  06:00    


 


Urine Glucose (UA)  NEGATIVE mg/dL (NEGATIVE)   12/23/18  06:00    


 


Urine Ketones  NEGATIVE mg/dL (NEGATIVE)   12/23/18  06:00    


 


Urine Blood  LARGE  (NEGATIVE)  H  12/23/18  06:00    


 


Urine Nitrate  NEGATIVE  (NEGATIVE)   12/23/18  06:00    


 


Urine Bilirubin  NEGATIVE  (NEGATIVE)   12/23/18  06:00    


 


Urine Urobilinogen  0.2 E.U./dL (0.2 - 1.0)   12/23/18  06:00    


 


Ur Leukocyte Esterase  SMALL  (NEGATIVE)  H  12/23/18  06:00    


 


Urine RBC  10-25 /hpf (0-5)  H  12/23/18  06:00    


 


Urine WBC  6-10 /hpf (0-5)   12/23/18  06:00    


 


Ur Epithelial Cells  OCCASIONAL /lpf (FEW)   12/23/18  06:00    


 


Urine Bacteria  1+ /hpf (NONE SEEN)  H  12/23/18  06:00    














- Physical Exam


Vitals and I&O: 


 Vital Signs











Temp  96.0 F   12/24/18 11:35


 


Pulse  65   12/24/18 11:35


 


Resp  18   12/24/18 11:35


 


BP  106/63   12/24/18 11:35


 


Pulse Ox  98   12/24/18 11:35











Active Medications: 


Current Medications





Acetaminophen (Tylenol)  325 mg PO Q4HR PRN


   PRN Reason: Pain or Fever >101


   Stop: 02/21/19 13:11


Bisacodyl (Dulcolax 10 Mg Supp)  10 mg RC Q96H PRN


   PRN Reason: Constipation


   Stop: 02/21/19 13:11


Calcium Carbonate (Os-Olivier)  500 mg PO BID [facility]


   Stop: 02/21/19 16:59


   Last Admin: 12/24/18 09:12 Dose:  500 mg


Cholecalciferol (Vitamin D3)  1,000 iu PO DAILY [facility]


   Stop: 02/21/19 13:14


   Last Admin: 12/24/18 09:05 Dose:  1,000 iu


Divalproex Sodium (Depakote Dr)  500 mg PO TID [facility]; Protocol


   Stop: 02/21/19 13:59


   Last Admin: 12/24/18 09:05 Dose:  500 mg


Docusate Sodium (Colace)  100 mg PO DAILY [facility]


   Stop: 02/22/19 08:59


   Last Admin: 12/24/18 09:07 Dose:  100 mg


Escitalopram Oxalate (Lexapro)  10 mg PO DAILY [facility]; Protocol


   Stop: 02/21/19 13:14


   Last Admin: 12/24/18 09:07 Dose:  10 mg


Fenofibrate (Tricor)  134 mg PO HS [facility]


   Stop: 02/21/19 20:59


   Last Admin: 12/23/18 20:13 Dose:  134 mg


Finasteride (Proscar)  5 mg PO HS [facility]; Protocol


   Stop: 02/21/19 20:59


   Last Admin: 12/23/18 20:13 Dose:  5 mg


Fish Oil (Omega 3)  1,000 mg PO BID [facility]


   Stop: 02/21/19 16:59


   Last Admin: 12/24/18 09:05 Dose:  1,000 mg


Dextrose/Sodium Chloride (D5-0.45ns)  1,000 mls @ 100 mls/hr IV .Q10H [facility]


   Stop: 02/20/19 22:06


   Last Admin: 12/24/18 05:43 Dose:  100 mls/hr


Ceftriaxone Sodium 1 gm/ (Sodium Chloride)  50 mls @ 100 mls/hr IV Q24HR [facility]


   Stop: 02/21/19 13:59


   Last Infusion: 12/23/18 15:05 Dose:  Infused


Lactulose (Cephulac)  30 gm PO DAILY PRN


   PRN Reason: Constipation


   Stop: 02/21/19 13:17


Magnesium Hydroxide (Milk Of Magnesia)  30 ml PO Q72H PRN


   PRN Reason: Constipation


   Stop: 02/21/19 13:11


Metoclopramide HCl (Reglan)  10 mg IVP Q8H [facility]


   Stop: 02/20/19 22:14


   Last Admin: 12/24/18 05:44 Dose:  10 mg


Metoprolol Tartrate (Lopressor)  25 mg PO BID [facility]


   Stop: 02/21/19 16:59


   Last Admin: 12/24/18 09:06 Dose:  25 mg


Miscellaneous (Solifenacin Succinate [Vesicare])  5 mg PO QPM [facility]


   Stop: 02/21/19 16:59


Olanzapine (Zyprexa)  2.5 mg PO HS [facility]; Protocol


   Stop: 02/21/19 20:59


   Last Admin: 12/23/18 20:13 Dose:  2.5 mg


Ondansetron HCl (Zofran)  4 mg IV Q4H PRN


   PRN Reason: Nausea


   Stop: 02/20/19 22:14


Oxybutynin Chloride (Ditropan)  10 mg PO DAILY [facility]


   Stop: 02/22/19 08:59


   Last Admin: 12/24/18 09:04 Dose:  10 mg


Pantoprazole Sodium (Protonix)  40 mg PO DAILY [facility]


   Stop: 02/22/19 08:59


   Last Admin: 12/24/18 09:07 Dose:  40 mg


Ramipril (Altace)  10 mg PO BID [facility]


   Stop: 02/21/19 16:59


   Last Admin: 12/24/18 09:06 Dose:  10 mg


Rivaroxaban (Xarelto)  20 mg PO DAILY [facility]


   Stop: 02/22/19 08:59


   Last Admin: 12/24/18 09:07 Dose:  20 mg


Sodium Phosphate (Fleet Enema)  135 ml RC Q96H PRN


   PRN Reason: Constipation


   Stop: 02/21/19 13:11


Tamsulosin HCl (Flomax)  0.4 mg PO HS [facility]


   Stop: 02/21/19 20:59


   Last Admin: 12/23/18 20:13 Dose:  0.4 mg











- Procedures


Procedures: 


 Procedures











Procedure Code Date


 


C & S-INTEGUMENT 91.63 05/25/95


 


CULTURE OTHR SPECIMN AEROBIC 21527 05/25/95


 


DILATION OF URETHRA, ENDO [id number] 11/21/18


 


DX ULTRASOUND-VASCULAR 88.77 12/28/95


 


ELECTROCARDIOGRAPH MONIT 89.54 12/28/95


 


INJECT ANTIBIOTIC 99.21 10/09/95


 


INJECT ANTICOAGULANT 99.19 05/25/95


 


MICROBE SUSCEPTIBLE DISK 70936 05/25/95


 


OTHER C.A.T. SCAN 88.38 12/28/95


 


URINARY SYSTEM X-RAY NEC 87.79 05/25/95


 


WHIRLPOOL THERAPY 27018 05/25/95


 


WHIRLPOOL TREATMENT 93.32 05/25/95

## 2018-12-25 LAB
ALBUMIN SERPL-MCNC: 2.9 GM/DL (ref 4.2–5.5)
ALBUMIN/GLOB SERPL: 1 {RATIO} (ref 1–1.8)
ALP SERPL-CCNC: 33 U/L (ref 34–104)
ALT SERPL-CCNC: 6 U/L (ref 7–52)
ANION GAP SERPL CALC-SCNC: 12.4 MMOL/L (ref 7–16)
AST SERPL-CCNC: 13 U/L (ref 13–39)
BASOPHILS # BLD AUTO: 0 TH/CUMM (ref 0–0.2)
BASOPHILS NFR BLD AUTO: 0.5 % (ref 0–2)
BILIRUB SERPL-MCNC: 0.3 MG/DL (ref 0.3–1)
BUN SERPL-MCNC: 19 MG/DL (ref 7–25)
CALCIUM SERPL-MCNC: 9 MG/DL (ref 8.6–10.3)
CHLORIDE SERPL-SCNC: 107 MEQ/L (ref 98–107)
CO2 SERPL-SCNC: 24.4 MEQ/L (ref 21–31)
CREAT SERPL-MCNC: 1.6 MG/DL (ref 0.7–1.3)
EOSINOPHIL # BLD AUTO: 0 TH/CMM (ref 0.1–0.4)
EOSINOPHIL NFR BLD AUTO: 0.1 % (ref 0–5)
ERYTHROCYTE [DISTWIDTH] IN BLOOD BY AUTOMATED COUNT: 12.2 % (ref 11.5–20)
GLOBULIN SER-MCNC: 2.9 GM/DL
GLUCOSE SERPL-MCNC: 153 MG/DL (ref 70–105)
HCT VFR BLD CALC: 28.2 % (ref 41–60)
HGB BLD-MCNC: 9.3 GM/DL (ref 12–16)
LYMPHOCYTE AB SER FC-ACNC: 0.7 TH/CMM (ref 1.5–3)
LYMPHOCYTES NFR BLD AUTO: 9.1 % (ref 20–50)
MCH RBC QN AUTO: 31.8 PG (ref 27–31)
MCHC RBC AUTO-ENTMCNC: 32.8 PG (ref 28–36)
MCV RBC AUTO: 96.9 FL (ref 80–99)
MONOCYTES # BLD AUTO: 0.2 TH/CMM (ref 0.3–1)
MONOCYTES NFR BLD AUTO: 2.1 % (ref 2–10)
NEUTROPHILS # BLD: 7.3 TH/CMM (ref 1.8–8)
NEUTROPHILS NFR BLD AUTO: 88.2 % (ref 40–80)
PLATELET # BLD: 248 TH/CMM (ref 150–400)
PMV BLD AUTO: 6.8 FL
POTASSIUM SERPL-SCNC: 4.8 MEQ/L (ref 3.5–5.1)
RBC # BLD AUTO: 2.91 MIL/CMM (ref 3.8–5.8)
SODIUM SERPL-SCNC: 139 MEQ/L (ref 136–145)
WBC # BLD AUTO: 8.2 TH/CMM (ref 4.8–10.8)

## 2018-12-25 RX ADMIN — METOCLOPRAMIDE SCH MG: 5 INJECTION, SOLUTION INTRAMUSCULAR; INTRAVENOUS at 06:50

## 2018-12-25 RX ADMIN — Medication SCH MG: at 16:27

## 2018-12-25 RX ADMIN — METOCLOPRAMIDE SCH MG: 5 INJECTION, SOLUTION INTRAMUSCULAR; INTRAVENOUS at 23:10

## 2018-12-25 RX ADMIN — Medication SCH MG: at 09:03

## 2018-12-25 RX ADMIN — DEXTROSE AND SODIUM CHLORIDE SCH MLS/HR: 5; .45 INJECTION, SOLUTION INTRAVENOUS at 03:00

## 2018-12-25 RX ADMIN — METOCLOPRAMIDE SCH MG: 5 INJECTION, SOLUTION INTRAMUSCULAR; INTRAVENOUS at 13:46

## 2018-12-25 RX ADMIN — DEXTROSE AND SODIUM CHLORIDE SCH MLS/HR: 5; .45 INJECTION, SOLUTION INTRAVENOUS at 23:11

## 2018-12-25 RX ADMIN — FENOFIBRATE SCH MG: 134 CAPSULE ORAL at 20:51

## 2018-12-25 RX ADMIN — Medication SCH TAB: at 09:01

## 2018-12-25 RX ADMIN — PANTOPRAZOLE SODIUM SCH MG: 40 TABLET, DELAYED RELEASE ORAL at 09:02

## 2018-12-25 RX ADMIN — DEXTROSE AND SODIUM CHLORIDE SCH MLS/HR: 5; .45 INJECTION, SOLUTION INTRAVENOUS at 13:00

## 2018-12-25 NOTE — GENERAL PROGRESS NOTE
Subjective





- Review of Systems


Service Date: 12/25/18


Subjective: 


Confused 





Objective





- Results


Result Diagrams: 


 12/25/18 04:01





 12/25/18 04:01


Recent Labs: 


 Laboratory Last Values











WBC  8.2 Th/cmm (4.8-10.8)   12/25/18  04:01    


 


RBC  2.91 Mil/cmm (3.80-5.80)  L  12/25/18  04:01    


 


Hgb  9.3 gm/dL (12-16)  L  12/25/18  04:01    


 


Hct  28.2 % (41.0-60)  L  12/25/18  04:01    


 


MCV  96.9 fl (80-99)   12/25/18  04:01    


 


MCH  31.8 pg (27.0-31.0)  H  12/25/18  04:01    


 


MCHC Differential  32.8 pg (28.0-36.0)   12/25/18  04:01    


 


RDW  12.2 % (11.5-20.0)   12/25/18  04:01    


 


Plt Count  248 Th/cmm (150-400)   12/25/18  04:01    


 


MPV  6.8 fl  12/25/18  04:01    


 


Neutrophils %  88.2 % (40.0-80.0)  H  12/25/18  04:01    


 


Lymphocytes %  9.1 % (20.0-50.0)  L  12/25/18  04:01    


 


Monocytes %  2.1 % (2.0-10.0)   12/25/18  04:01    


 


Eosinophils %  0.1 % (0.0-5.0)   12/25/18  04:01    


 


Basophils %  0.5 % (0.0-2.0)   12/25/18  04:01    


 


PT  13.3 SECONDS (9.5-11.5)  H  12/22/18  19:31    


 


INR  1.30  (0.5-1.4)   12/22/18  19:31    


 


PTT (Actin FS)  23.3 SECONDS (26.0-38.0)  L  12/22/18  19:31    


 


Specimen Source  ARTERIAL   12/22/18  19:50    


 


Sample Site  Right Radial   12/22/18  19:50    


 


pH  7.45  (7.35-7.45)   12/22/18  19:50    


 


pCO2  41.0 mmHg (35.0-45.0)   12/22/18  19:50    


 


pO2  68.0 mmHg (80.0-100.0)  L  12/22/18  19:50    


 


HCO3  28.0 mEq/L (20.0-26.0)  H  12/22/18  19:50    


 


Base Excess  4.1 mEq/L (-3.0-3.0)  H  12/22/18  19:50    


 


O2 Saturation  94.0 % (92.0-100.0)   12/22/18  19:50    


 


Jaswant Test  Positive   12/22/18  19:50    


 


Vent Rate  N/A   12/22/18  19:50    


 


Inspired O2  21   12/22/18  19:50    


 


Tidal Volume  N/A   12/22/18  19:50    


 


PEEP  N/A   12/22/18  19:50    


 


Pressure (ins/psv/peep)  N/A   12/22/18  19:50    


 


Critical Value  MM,RCP   12/22/18  19:50    


 


Sodium  139 mEq/L (136-145)   12/25/18  04:01    


 


Potassium  4.8 mEq/L (3.5-5.1)   12/25/18  04:01    


 


Chloride  107 mEq/L ()   12/25/18  04:01    


 


Carbon Dioxide  24.4 mEq/L (21.0-31.0)   12/25/18  04:01    


 


Anion Gap  12.4  (7.0-16.0)   12/25/18  04:01    


 


BUN  19 mg/dL (7-25)   12/25/18  04:01    


 


Creatinine  1.6 mg/dL (0.7-1.3)  H  12/25/18  04:01    


 


Est GFR ( Amer)  TNP   12/25/18  04:01    


 


Est GFR (Non-Af Amer)  TNP   12/25/18  04:01    


 


BUN/Creatinine Ratio  11.9   12/25/18  04:01    


 


Glucose  153 mg/dL ()  H  12/25/18  04:01    


 


POC Glucose  95 MG/DL (70 - 105)   12/22/18  23:47    


 


Calcium  9.0 mg/dL (8.6-10.3)   12/25/18  04:01    


 


Magnesium  2.0 mg/dL (1.9-2.7)   12/24/18  05:30    


 


Total Bilirubin  0.3 mg/dL (0.3-1.0)   12/25/18  04:01    


 


AST  13 U/L (13-39)   12/25/18  04:01    


 


ALT  6 U/L (7-52)  L  12/25/18  04:01    


 


Alkaline Phosphatase  33 U/L ()  L  12/25/18  04:01    


 


Troponin I  0.01 ng/mL (0.01-0.05)   12/22/18  19:31    


 


Total Protein  5.8 gm/dL (6.0-8.3)  L  12/25/18  04:01    


 


Albumin  2.9 gm/dL (4.2-5.5)  L  12/25/18  04:01    


 


Globulin  2.9 gm/dL  12/25/18  04:01    


 


Albumin/Globulin Ratio  1.0  (1.0-1.8)   12/25/18  04:01    


 


TSH  8.11 uIU/ml (0.34-5.60)  H  12/23/18  04:38    


 


Urine Source  CLEAN C   12/23/18  06:00    


 


Urine Color  YELLOW   12/23/18  06:00    


 


Urine Clarity  TURBID  (CLEAR)   12/23/18  06:00    


 


Urine pH  6.5  (4.6 - 8.0)   12/23/18  06:00    


 


Ur Specific Gravity  1.015  (1.005-1.030)   12/23/18  06:00    


 


Urine Protein  30 mg/dL (NEGATIVE)  H  12/23/18  06:00    


 


Urine Glucose (UA)  NEGATIVE mg/dL (NEGATIVE)   12/23/18  06:00    


 


Urine Ketones  NEGATIVE mg/dL (NEGATIVE)   12/23/18  06:00    


 


Urine Blood  LARGE  (NEGATIVE)  H  12/23/18  06:00    


 


Urine Nitrate  NEGATIVE  (NEGATIVE)   12/23/18  06:00    


 


Urine Bilirubin  NEGATIVE  (NEGATIVE)   12/23/18  06:00    


 


Urine Urobilinogen  0.2 E.U./dL (0.2 - 1.0)   12/23/18  06:00    


 


Ur Leukocyte Esterase  SMALL  (NEGATIVE)  H  12/23/18  06:00    


 


Urine RBC  10-25 /hpf (0-5)  H  12/23/18  06:00    


 


Urine WBC  6-10 /hpf (0-5)   12/23/18  06:00    


 


Ur Epithelial Cells  OCCASIONAL /lpf (FEW)   12/23/18  06:00    


 


Urine Bacteria  1+ /hpf (NONE SEEN)  H  12/23/18  06:00    














- Physical Exam


Vitals and I&O: 


 Vital Signs











Temp  99.1 F   12/25/18 09:00


 


Pulse  70   12/25/18 09:03


 


Resp  15   12/25/18 09:00


 


BP  91/42   12/25/18 09:03


 


Pulse Ox  96   12/25/18 09:00








 Intake & Output











 12/24/18 12/25/18 12/25/18





 18:59 06:59 18:59


 


Intake Total 1670 1000 


 


Balance 1670 1000 


 


Weight (lbs) 67.132 kg  


 


Intake:   


 


  Intake, IV Amount 1050 1000 


 


    D5-0.45NS 1,000 ml @ 100 1000 1000 





    mls/hr IV .Q10H [facility] Rx#:   





    [id number]   


 


    cefTRIAXone 1 gm In 50  





    Sodium Chloride 0.9% 50   





    ml @ 100 mls/hr IV Q24HR   





    [facility] Rx#:[id number]   


 


  Oral 120  


 


  Other 500  


 


Other:   


 


  # Voids 1  


 


  # Bowel Movements 0  


 


  Stool Characteristics  Soft 





  Brown 


 


  Weight Source Bedscale  











Active Medications: 


Current Medications





Acetaminophen (Tylenol)  325 mg PO Q4HR PRN


   PRN Reason: Pain or Fever >101


   Stop: 02/21/19 13:11


Bisacodyl (Dulcolax 10 Mg Supp)  10 mg RC Q96H PRN


   PRN Reason: Constipation


   Stop: 02/21/19 13:11


Calcium Carbonate (Os-Olivier)  500 mg PO BID [facility]


   Stop: 02/21/19 16:59


   Last Admin: 12/25/18 09:02 Dose:  500 mg


Cholecalciferol (Vitamin D3)  1,000 iu PO DAILY [facility]


   Stop: 02/21/19 13:14


   Last Admin: 12/25/18 09:03 Dose:  1,000 iu


Divalproex Sodium (Depakote Dr)  500 mg PO TID [facility]; Protocol


   Stop: 02/21/19 13:59


   Last Admin: 12/25/18 09:03 Dose:  500 mg


Docusate Sodium (Colace)  100 mg PO DAILY [facility]


   Stop: 02/22/19 08:59


   Last Admin: 12/25/18 09:01 Dose:  100 mg


Escitalopram Oxalate (Lexapro)  10 mg PO DAILY [facility]; Protocol


   Stop: 02/21/19 13:14


   Last Admin: 12/25/18 09:03 Dose:  10 mg


Fenofibrate (Tricor)  134 mg PO HS [facility]


   Stop: 02/21/19 20:59


   Last Admin: 12/24/18 22:05 Dose:  Not Given


Finasteride (Proscar)  5 mg PO HS [facility]; Protocol


   Stop: 02/21/19 20:59


   Last Admin: 12/24/18 22:05 Dose:  Not Given


Fish Oil (Omega 3)  1,000 mg PO BID [facility]


   Stop: 02/21/19 16:59


   Last Admin: 12/25/18 09:03 Dose:  1,000 mg


Dextrose/Sodium Chloride (D5-0.45ns)  1,000 mls @ 100 mls/hr IV .Q10H [facility]


   Stop: 02/20/19 22:06


   Last Admin: 12/25/18 03:00 Dose:  100 mls/hr


Ceftriaxone Sodium 1 gm/ (Sodium Chloride)  50 mls @ 100 mls/hr IV Q24HR [facility]


   Stop: 02/21/19 13:59


   Last Infusion: 12/24/18 18:46 Dose:  Infused


Lactulose (Cephulac)  30 gm PO DAILY PRN


   PRN Reason: Constipation


   Stop: 02/21/19 13:17


Magnesium Hydroxide (Milk Of Magnesia)  30 ml PO Q72H PRN


   PRN Reason: Constipation


   Stop: 02/21/19 13:11


Metoclopramide HCl (Reglan)  10 mg IVP Q8H [facility]


   Stop: 02/20/19 22:14


   Last Admin: 12/25/18 06:50 Dose:  10 mg


Metoprolol Tartrate (Lopressor)  25 mg PO BID [facility]


   Stop: 02/21/19 16:59


   Last Admin: 12/25/18 09:03 Dose:  Not Given


Olanzapine (Zyprexa)  2.5 mg PO HS [facility]; Protocol


   Stop: 02/21/19 20:59


   Last Admin: 12/24/18 22:04 Dose:  2.5 mg


Ondansetron HCl (Zofran)  4 mg IV Q4H PRN


   PRN Reason: Nausea


   Stop: 02/20/19 22:14


Oxybutynin Chloride (Ditropan)  5 mg PO BID [facility]


   Stop: 02/22/19 08:59


Pantoprazole Sodium (Protonix)  40 mg PO DAILY [facility]


   Stop: 02/22/19 08:59


   Last Admin: 12/25/18 09:02 Dose:  40 mg


Ramipril (Altace)  10 mg PO BID [facility]


   Stop: 02/21/19 16:59


   Last Admin: 12/25/18 09:02 Dose:  Not Given


Rivaroxaban (Xarelto)  20 mg PO DAILY [facility]


   Stop: 02/22/19 08:59


   Last Admin: 12/25/18 09:03 Dose:  20 mg


Sodium Phosphate (Fleet Enema)  135 ml RC Q96H PRN


   PRN Reason: Constipation


   Stop: 02/21/19 13:11


Tamsulosin HCl (Flomax)  0.4 mg PO HS [facility]


   Stop: 02/21/19 20:59


   Last Admin: 12/24/18 22:03 Dose:  0.4 mg








General: Other (Confused)


HEENT: Atraumatic, PERRLA


Neck: Supple


Cardiovascular: Regular rate


Lungs: Clear to auscultation


Abdomen: Bowel sounds, Soft


Extremities: Other (No edema)


Neurological: Other (Non ambulatory)


Skin: Other (Warm and dry)


Psych/Mental Status: Other (Confused, not oriented)





- Procedures


Procedures: 


 Procedures











Procedure Code Date


 


C & S-INTEGUMENT 91.63 05/25/95


 


CULTURE OTHR SPECIMN AEROBIC 30453 05/25/95


 


DILATION OF URETHRA, ENDO [id number] 11/21/18


 


DX ULTRASOUND-VASCULAR 88.77 12/28/95


 


ELECTROCARDIOGRAPH MONIT 89.54 12/28/95


 


INJECT ANTIBIOTIC 99.21 10/09/95


 


INJECT ANTICOAGULANT 99.19 05/25/95


 


MICROBE SUSCEPTIBLE DISK 65128 05/25/95


 


OTHER C.A.T. SCAN 88.38 12/28/95


 


URINARY SYSTEM X-RAY NEC 87.79 05/25/95


 


WHIRLPOOL THERAPY 51708 05/25/95


 


WHIRLPOOL TREATMENT 93.32 05/25/95














Assessment/Plan





- Assessment


Assessment: 


patient is sleeping but arousable, he is stable. Dx: Protacted Nausea and vomit

, UTI, CKD, HTN, CVA/TIA, BPH, Hydronephrosis, Mentally challenge, Dementia. 





- Plan


Plan: 


Patient is transferred to [facility].  Nasal O2, Continue with SNF meds. Will 

continue to monitor.

## 2018-12-26 LAB
ANION GAP SERPL CALC-SCNC: 10.6 MMOL/L (ref 7–16)
BASOPHILS # BLD AUTO: 0.1 TH/CUMM (ref 0–0.2)
BASOPHILS NFR BLD AUTO: 1 % (ref 0–2)
BUN SERPL-MCNC: 19 MG/DL (ref 7–25)
CALCIUM SERPL-MCNC: 9 MG/DL (ref 8.6–10.3)
CHLORIDE SERPL-SCNC: 107 MEQ/L (ref 98–107)
CO2 SERPL-SCNC: 27.7 MEQ/L (ref 21–31)
CREAT SERPL-MCNC: 1.5 MG/DL (ref 0.7–1.3)
EOSINOPHIL # BLD AUTO: 0.2 TH/CMM (ref 0.1–0.4)
EOSINOPHIL NFR BLD AUTO: 3.2 % (ref 0–5)
ERYTHROCYTE [DISTWIDTH] IN BLOOD BY AUTOMATED COUNT: 12.5 % (ref 11.5–20)
GLUCOSE SERPL-MCNC: 95 MG/DL (ref 70–105)
HCT VFR BLD CALC: 27.9 % (ref 41–60)
HGB BLD-MCNC: 9.2 GM/DL (ref 12–16)
LYMPHOCYTE AB SER FC-ACNC: 2.7 TH/CMM (ref 1.5–3)
LYMPHOCYTES NFR BLD AUTO: 35.9 % (ref 20–50)
MCH RBC QN AUTO: 32.2 PG (ref 27–31)
MCHC RBC AUTO-ENTMCNC: 33.1 PG (ref 28–36)
MCV RBC AUTO: 97.4 FL (ref 80–99)
MONOCYTES # BLD AUTO: 0.6 TH/CMM (ref 0.3–1)
MONOCYTES NFR BLD AUTO: 7.5 % (ref 2–10)
NEUTROPHILS # BLD: 3.8 TH/CMM (ref 1.8–8)
NEUTROPHILS NFR BLD AUTO: 52.4 % (ref 40–80)
PLATELET # BLD: 207 TH/CMM (ref 150–400)
PMV BLD AUTO: 7 FL
POTASSIUM SERPL-SCNC: 4.3 MEQ/L (ref 3.5–5.1)
RBC # BLD AUTO: 2.87 MIL/CMM (ref 3.8–5.8)
SODIUM SERPL-SCNC: 141 MEQ/L (ref 136–145)
WBC # BLD AUTO: 7.4 TH/CMM (ref 4.8–10.8)

## 2018-12-26 RX ADMIN — Medication SCH MG: at 08:25

## 2018-12-26 RX ADMIN — PANTOPRAZOLE SODIUM SCH MG: 40 TABLET, DELAYED RELEASE ORAL at 08:24

## 2018-12-26 RX ADMIN — DEXTROSE AND SODIUM CHLORIDE SCH MLS/HR: 5; .45 INJECTION, SOLUTION INTRAVENOUS at 10:34

## 2018-12-26 RX ADMIN — METOCLOPRAMIDE SCH MG: 5 INJECTION, SOLUTION INTRAMUSCULAR; INTRAVENOUS at 07:25

## 2018-12-26 RX ADMIN — Medication SCH MG: at 16:15

## 2018-12-26 RX ADMIN — METOCLOPRAMIDE SCH MG: 5 INJECTION, SOLUTION INTRAMUSCULAR; INTRAVENOUS at 13:22

## 2018-12-26 RX ADMIN — Medication SCH TAB: at 08:24

## 2018-12-26 NOTE — DISCHARGE SUMMARY
DATE OF DISCHARGE:     12/26/2018



DATE OF DISCHARGE:  12/26/2018.



ADMITTING DIAGNOSES:

1.  Protracted nausea and vomiting.

2.  Urinary tract infection.

3.  Constipation.

4.  Mild leukocytosis.



SECONDARY DIAGNOSES:

1.  Chronic renal insufficiency secondary to hyperactive bladder.

2.  History of severe hydronephrosis.

3.  History of depression.

4.  Psych disorder.

5.  History of essential hypertension.

6.  History of lower extremity deep venous thrombosis.

7.  History of dyslipidemia.

8.  History of chronic anemia.



DISCHARGE DIAGNOSES:

1.  Protracted nausea, vomiting - likely 2ry to UTI vs ileus vs less likely AGE
- resolved.

2.  Urinary tract infection - clinically stable.

3.  History of dysphagia with a choking episode - clinically stable.

4.  Ileus/constipation-clinically improved.



CONSULTANTS:  No consultants were used during this admission.



SIGNIFICANT FINDINGS/MAJOR PROCEDURES:  There was a KUB done on admission

showing mildly distended stool filled large bowel.  Findings may be associated

with a degree of constipation.



Chest x-ray done on admission showed:

1.  No definite acute focal processes.

2.  Cardiomegaly.



BRIEF HOSPITAL COURSE:  A 73-year-old  male, resident of [facility] who was recently admitted to this facility with UTI, acute

gastroenteritis, and acute on chronic renal insufficiency as well as

hydronephrosis.  The patient was transferred back to the ER with a 1-day history

of persistent nausea and vomiting, but there was no mention of diarrhea or

abdominal pain.  The patient was given IV Zofran at the ER, but his nausea did

not improve; therefore, he was electively admitted for further management and

care with diagnosis of protracted nausea, vomiting.  Other findings include a UA

consistent with a UTI and a KUB showed a mildly distended stool filled large

bowel.  He was placed on IV fluids, bowel regimen including Dulcolax per

rectum and IV antibiotics namely Rocephin daily.  On hospital day #2, the

patient had a choking spell while eating lunch and was transferred to the ICU

for close monitoring.  He was placed on oxygen support, but was quickly noted to

improve with good O2 sats and vital signs, therefore, he was transferred back to

the telemetry miner.  Since being transferred back to tele miner, the patient has

remained stable, tolerating his p.o. as well with no evidence of further choking

spells.  His vital signs have remained stable with no episodes of fevers and his

labs have also remained at baseline on discharge. He has had no further episode

of emesis and has had regular bowel movements.



DISCHARGE MEDICATIONS:  Iron sulfate 325 mg t.i.d., acetaminophen 325 mg q. 4

p.r.n. for pain, Dulcolax suppository 10 mg daily p.r.n. for constipation,

calcium carbonate 500 mg b.i.d., vitamin D3 1000 international units q. day,

Dexilant 60 mg q. day, Depakote 500 mg t.i.d., docusate sodium 250 mg b.i.d.,

Lexapro 10 mg q. day, fenofibrate 120 mg at bedtime, Proscar 5 mg at bedtime,

fish oil 1000 mg b.i.d., fleet enema 135 mL per rectum q. 96 hours p.r.n. for

severe constipation, milk of magnesia 30 mL q. 72 hours, metoprolol 25 mg

b.i.d., multivitamins and minerals q. day, olanzapine 2.5 mg at bedtime,

omeprazole 20 mg q. day, oxybutynin 15 mg 2 tabs daily, Altace 10 mg b.i.d.,

Xarelto 20 mg q. day, VESIcare 5 mg q.p.m., Flomax 0.4 mg at bedtime.



CONDITION ON DISCHARGE:  Stable.



DISPOSITION:  The patient was transferred back to [facility] under

the care of Dr. Castillo.





DD: 12/26/2018 11:23

DT: 12/26/2018 19:29

[facility]# [id number]  [id number]

[facility]

## 2019-01-22 ENCOUNTER — HOSPITAL ENCOUNTER (INPATIENT)
Dept: HOSPITAL 36 - ER | Age: 74
LOS: 3 days | Discharge: NURSING, REHAB OR LONG-TERM CARE | DRG: 871 | End: 2019-01-25
Attending: INTERNAL MEDICINE | Admitting: INTERNAL MEDICINE
Payer: MEDICARE

## 2019-01-22 VITALS — DIASTOLIC BLOOD PRESSURE: 55 MMHG | SYSTOLIC BLOOD PRESSURE: 115 MMHG

## 2019-01-22 DIAGNOSIS — N32.81: ICD-10-CM

## 2019-01-22 DIAGNOSIS — R53.2: ICD-10-CM

## 2019-01-22 DIAGNOSIS — E87.5: ICD-10-CM

## 2019-01-22 DIAGNOSIS — Z86.711: ICD-10-CM

## 2019-01-22 DIAGNOSIS — Z86.718: ICD-10-CM

## 2019-01-22 DIAGNOSIS — I12.9: ICD-10-CM

## 2019-01-22 DIAGNOSIS — D64.9: ICD-10-CM

## 2019-01-22 DIAGNOSIS — F79: ICD-10-CM

## 2019-01-22 DIAGNOSIS — F32.9: ICD-10-CM

## 2019-01-22 DIAGNOSIS — N40.0: ICD-10-CM

## 2019-01-22 DIAGNOSIS — K52.9: ICD-10-CM

## 2019-01-22 DIAGNOSIS — F03.90: ICD-10-CM

## 2019-01-22 DIAGNOSIS — Z82.49: ICD-10-CM

## 2019-01-22 DIAGNOSIS — F29: ICD-10-CM

## 2019-01-22 DIAGNOSIS — F20.0: ICD-10-CM

## 2019-01-22 DIAGNOSIS — I48.0: ICD-10-CM

## 2019-01-22 DIAGNOSIS — N18.9: ICD-10-CM

## 2019-01-22 DIAGNOSIS — R56.9: ICD-10-CM

## 2019-01-22 DIAGNOSIS — E86.0: ICD-10-CM

## 2019-01-22 DIAGNOSIS — B35.1: ICD-10-CM

## 2019-01-22 DIAGNOSIS — A41.9: Primary | ICD-10-CM

## 2019-01-22 DIAGNOSIS — N39.0: ICD-10-CM

## 2019-01-22 LAB
ALBUMIN SERPL-MCNC: 3.2 GM/DL (ref 4.2–5.5)
ALBUMIN/GLOB SERPL: 0.9 {RATIO} (ref 1–1.8)
ALP SERPL-CCNC: 27 U/L (ref 34–104)
ALT SERPL-CCNC: 6 U/L (ref 7–52)
AMYLASE SERPL-CCNC: 29 U/L (ref 29–103)
ANION GAP SERPL CALC-SCNC: 12.4 MMOL/L (ref 7–16)
APPEARANCE UR: (no result)
AST SERPL-CCNC: 10 U/L (ref 13–39)
BACTERIA #/AREA URNS HPF: (no result) /HPF
BASOPHILS # BLD AUTO: 0 TH/CUMM (ref 0–0.2)
BASOPHILS NFR BLD AUTO: 0.4 % (ref 0–2)
BILIRUB SERPL-MCNC: 0.2 MG/DL (ref 0.3–1)
BILIRUB UR-MCNC: NEGATIVE MG/DL
BUN SERPL-MCNC: 35 MG/DL (ref 7–25)
CALCIUM SERPL-MCNC: 9.1 MG/DL (ref 8.6–10.3)
CHLORIDE SERPL-SCNC: 104 MEQ/L (ref 98–107)
CK SERPL-CCNC: 31 U/L (ref 30–223)
CO2 SERPL-SCNC: 24.9 MEQ/L (ref 21–31)
COLOR UR: (no result)
CREAT SERPL-MCNC: 2 MG/DL (ref 0.7–1.3)
EOSINOPHIL # BLD AUTO: 0.6 TH/CMM (ref 0.1–0.4)
EOSINOPHIL NFR BLD AUTO: 6.7 % (ref 0–5)
EPI CELLS URNS QL MICRO: (no result) /LPF
ERYTHROCYTE [DISTWIDTH] IN BLOOD BY AUTOMATED COUNT: 12.4 % (ref 11.5–20)
GLOBULIN SER-MCNC: 3.5 GM/DL
GLUCOSE SERPL-MCNC: 123 MG/DL (ref 70–105)
GLUCOSE UR STRIP-MCNC: NEGATIVE MG/DL
HCT VFR BLD CALC: 29.7 % (ref 41–60)
HGB BLD-MCNC: 10.1 GM/DL (ref 12–16)
INR PPP: 1.11 (ref 0.5–1.4)
KETONES UR STRIP-MCNC: NEGATIVE MG/DL
LEUKOCYTE ESTERASE UR-ACNC: (no result)
LIPASE SERPL-CCNC: 17 U/L (ref 11–82)
LYMPHOCYTE AB SER FC-ACNC: 1.5 TH/CMM (ref 1.5–3)
LYMPHOCYTES NFR BLD AUTO: 16.4 % (ref 20–50)
MCH RBC QN AUTO: 32.4 PG (ref 27–31)
MCHC RBC AUTO-ENTMCNC: 33.9 PG (ref 28–36)
MCV RBC AUTO: 95.6 FL (ref 80–99)
MICRO URNS: YES
MONOCYTES # BLD AUTO: 0.9 TH/CMM (ref 0.3–1)
MONOCYTES NFR BLD AUTO: 10.1 % (ref 2–10)
NEUTROPHILS # BLD: 6 TH/CMM (ref 1.8–8)
NEUTROPHILS NFR BLD AUTO: 66.4 % (ref 40–80)
NITRITE UR QL STRIP: POSITIVE
PH UR STRIP: 7 [PH] (ref 4.6–8)
PLATELET # BLD: 279 TH/CMM (ref 150–400)
PMV BLD AUTO: 7.2 FL
POTASSIUM SERPL-SCNC: 5.3 MEQ/L (ref 3.5–5.1)
PROT UR STRIP-MCNC: (no result) MG/DL
PROTHROMBIN TIME: 11.4 SECONDS (ref 9.5–11.5)
RBC # BLD AUTO: 3.11 MIL/CMM (ref 3.8–5.8)
RBC # UR STRIP: (no result) /UL
RBC #/AREA URNS HPF: (no result) /HPF (ref 0–5)
SODIUM SERPL-SCNC: 136 MEQ/L (ref 136–145)
SP GR UR STRIP: 1.01 (ref 1–1.03)
TROPONIN I SERPL-MCNC: < 0.01 NG/ML (ref 0.01–0.05)
URINALYSIS COMPLETE PNL UR: (no result)
UROBILINOGEN UR STRIP-ACNC: 0.2 E.U./DL (ref 0.2–1)
WBC # BLD AUTO: 9 TH/CMM (ref 4.8–10.8)

## 2019-01-22 PROCEDURE — Z7610: HCPCS

## 2019-01-22 NOTE — ED PHYSICIAN CHART
ED Chief Complaint/HPI





- Patient Information


Date Seen:: 01/22/19


Time Seen:: 14:15


Chief Complaint:: Vomiting


History of Present Illness:: 





onset x 2 days of N/V/D x 8; no report of trauma, LOC, ALOC, AMS, H/As, S/T, 

neck pain, cough, C/P, SOB, Abd. Pain, A/C, bleeding, or urinary s/s


Allergies:: 


 Allergies











Allergy/AdvReac Type Severity Reaction Status Date / Time


 


niacin Allergy   Unverified 01/22/19 14:31











Vitals:: 


 Vital Signs - 8 hr











  01/22/19 01/22/19





  14:18 16:04


 


Temp 97.4 F 97.4 F


 


HR 72 66


 


RR 18 17


 


/60 105/56


 


O2 Sat % 97 97











Historian:: Patient, EMS


Review:: Nurse's Note Reviewed, Old Chart Reviewed, EMS run form Reviewed





ED Review of Systems





- Review of Systems


General/Constitutional: No fever, No chills, No weight loss, No weakness, No 

diaphoresis, No edema, No loss of appetite


Skin: No skin lesions, No rash, No bruising


Head: No headache, No light-headedness


Eyes: No loss of vision, No pain, No diplopia


ENT: No earache, No nasal drainage, No sore throat, No tinnitus


Neck: No neck pain, No swelling, No thyromegaly, No stiffness, No mass noted


Cardio Vascular: No chest pain, No palpitations, No PND, No orthopnea, No edema


Pulmonary: No SOB, No cough, No sputum, No wheezing


GI: Nausea, Vomiting, Diarrhea, No pain, No melena, No hematochezia, No 

constipation, No hematemesis


G/U: No dysuria, No frequency, No hematuria, No nacturia


Musculoskeletal: No bone or joint pain, No back pain, No muscle pain


Endocrine: No polyuria, No polydipsia


Psychiatric: No prior psych history, No depression, No anxiety, No suicidal 

ideation, No homicidal ideation, No auditory hallucination, No visual 

hallucination


Hematopoietic: No bruising, No lymphadenopathy


Allergic/Immuno: No urticaria, No angioedema


Neurological: No syncope, No focal symptoms, No weakness, No paresthesia, No 

headache, No seizure, No dizziness, Confusion, No vertigo





ED Past Medical History





- Past Medical History


Obtainable: Yes


Past Medical History: HTN, DVT/PE, Dyslipidemia, Seizures, Dementia, Other


Family History: HTN


Social History: Non Smoker, No Alcohol, No Drug Use, Single, Care Facility


Surgical History: None


Psychiatricy History: Dementia


Medication: Reviewed





Family Medical History





- Family Member


  ** Mother


History Unknown: Yes


Ethnicity: Unknown


Living Status: Unknown


Hx Family Cancer:  (UNKNOWN)


Hx Family Coronary Artery Disease:  (UNKNOWN)


Hx Family Congestive Heart Failure:  (UNKNOWN)


Hx Family Hypertension:  (UNKNOWN)


Hx Family Stroke:  (UNKNOWN)


Hx Family Diabetes:  (UNKNOWN)


Hx Family Seizures:  (UNKNOWN)


Hx Family Dementia:  (UNKNOWN)


Hx Family AIDS:  (UNKNOWN)


Hx Family COPD:  (UNKNOWN)


Hx Family Hepatitis:  (UNKNOWN)


Hx Family Psychiatric Problems:  (UNKNOWN)


Hx Family Tuberculosis:  (UNKNOWN)





ED Physical Exam





- Physical Examination


General/Constitutional: Awake, Well-developed, well-nourished, Alert, No 

distress, GCS 15, Non-toxic appearing, Ambulatory


Head: Atraumatic


Eyes: Lids, conjuctiva normal, PERRL, EOMI


Skin: Nl inspection, No rash, No skin lesions, No ecchymosis, Well hydrated, No 

lymphadenopathy


ENMT: External ears, nose nl, TM canals nl, Nasal exam nl, Lips, teeth, gums nl

, Oropharynx nl, Tonsils nl


Neck: Nontender, Full ROM w/o pain, No JVD, No nuchal rigidity, No bruit, No 

mass, No stridor


Respiratory: Nl effort/Exclusion, Clear to Auscultation, No Wheeze/Rhonchi/Rales


Cardio Vascular: RRR, No murmur, gallop, rubs, NL S1 S2, Carotid/Femoral/Distal 

pulses equal bilaterally


GI: No tenderness/rebounding/guarding, No organomegaly, No hernia, Normal BS's, 

Nondistended, No mass/bruits, No McBurney tenderness, Rectum exam nl


: No CVA tenderness


Extremities: No tenderness or effusion, Full ROM, normal strength in all 

extremities, No edema, Normal digits & nails


Neuro/Psych: Alert/oriented, DTR's symmetric, Normal sensory exam, Normal motor 

strength, Judgement/insight normal, Mood normal, Normal gait, No focal deficits


Misc: Normal back, No paraspinal tenderness





ED Labs/Radiology/EKG Results





- Lab Results


Results: 


 Laboratory Tests











  01/22/19 01/22/19 01/22/19





  14:59 14:59 14:59


 


WBC  9.0  


 


RBC  3.11 L  


 


Hgb  10.1 L  


 


Hct  29.7 L  


 


MCV  95.6  


 


MCH  32.4 H  


 


MCHC Differential  33.9  


 


RDW  12.4  


 


Plt Count  279  


 


MPV  7.2  


 


Neutrophils %  66.4  


 


Lymphocytes %  16.4 L  


 


Monocytes %  10.1 H  


 


Eosinophils %  6.7 H  


 


Basophils %  0.4  


 


PT   11.4 


 


INR   1.11 


 


PTT (Actin FS)   27.9 


 


Sodium    136


 


Potassium    5.3 H


 


Chloride    104


 


Carbon Dioxide    24.9


 


Anion Gap    12.4


 


BUN    35 H


 


Creatinine    2.0 H


 


Est GFR ( Amer)    TNP


 


Est GFR (Non-Af Amer)    TNP


 


BUN/Creatinine Ratio    17.5


 


Glucose    123 H


 


Whole Bld Lactic Acid   


 


Calcium    9.1


 


Total Bilirubin    0.2 L


 


AST    10 L


 


ALT    6 L


 


Alkaline Phosphatase    27 L


 


Creatine Kinase    31


 


Troponin I   


 


Total Protein    6.7


 


Albumin    3.2 L


 


Globulin    3.5


 


Albumin/Globulin Ratio    0.9 L


 


Urine Source   


 


Urine Color   


 


Urine Clarity   


 


Urine pH   


 


Ur Specific Gravity   


 


Urine Protein   


 


Urine Glucose (UA)   


 


Urine Ketones   


 


Urine Blood   


 


Urine Nitrate   


 


Urine Bilirubin   


 


Urine Urobilinogen   


 


Ur Leukocyte Esterase   


 


Urine RBC   


 


Urine WBC   


 


Ur Epithelial Cells   


 


Urine Bacteria   














  01/22/19 01/22/19





  14:59 15:00


 


WBC  


 


RBC  


 


Hgb  


 


Hct  


 


MCV  


 


MCH  


 


MCHC Differential  


 


RDW  


 


Plt Count  


 


MPV  


 


Neutrophils %  


 


Lymphocytes %  


 


Monocytes %  


 


Eosinophils %  


 


Basophils %  


 


PT  


 


INR  


 


PTT (Actin FS)  


 


Sodium  


 


Potassium  


 


Chloride  


 


Carbon Dioxide  


 


Anion Gap  


 


BUN  


 


Creatinine  


 


Est GFR ( Amer)  


 


Est GFR (Non-Af Amer)  


 


BUN/Creatinine Ratio  


 


Glucose  


 


Whole Bld Lactic Acid  1.58 


 


Calcium  


 


Total Bilirubin  


 


AST  


 


ALT  


 


Alkaline Phosphatase  


 


Creatine Kinase  


 


Troponin I  < 0.01 L 


 


Total Protein  


 


Albumin  


 


Globulin  


 


Albumin/Globulin Ratio  


 


Urine Source   MIDSTREAM


 


Urine Color   STRAW


 


Urine Clarity   HAZY


 


Urine pH   7.0


 


Ur Specific Gravity   1.010


 


Urine Protein   TRACE


 


Urine Glucose (UA)   NEGATIVE


 


Urine Ketones   NEGATIVE


 


Urine Blood   SMALL H


 


Urine Nitrate   POSITIVE H


 


Urine Bilirubin   NEGATIVE


 


Urine Urobilinogen   0.2


 


Ur Leukocyte Esterase   MODERATE H


 


Urine RBC   2-5 H


 


Urine WBC   10-25 H


 


Ur Epithelial Cells   FEW


 


Urine Bacteria   2+ H














- Radiology Results


Comments:: 





CXR: CPOD; NAD





- EKG Interpretations


EKG Time:: 15:09


Rate & Rhythm: 81; NSR


Comments:: 





RBBB; LAFB; non-specific st-t changes





ED Septic Shock





- .


Is Septic Shock (SBP<90, OR Lactate>4 mmol\L) present?: No





- <6hrs of presentation:


Vital Signs: 


 Vital Signs - 8 hr











  01/22/19 01/22/19





  14:18 16:04


 


Temp 97.4 F 97.4 F


 


HR 72 66


 


RR 18 17


 


/60 105/56


 


O2 Sat % 97 97














ED Reassessment (Disposition)





- Reassessment


Reassessment Condition:: Improved





- Diagnosis


Diagnosis:: 





N/V/D; AGE; Dehydration; UTI; Sepsis; Anemia; Hyperkalemia





- Aftercare/Follow up Instructions


Aftercare/Follow-Up Instructions:: Counseled pt regarding lab results/diagnosis 

& need follow up, Counseled pt & family regarding lab results/diagnosis & need 

follow up





- Patient Disposition


Discharge/Transfer:: Acute Care w/in this hosp


Accepting Physician:: Dr. Ferrell


Time Called:: 1600


Time Responded:: 16:00


Admitted to:: Telemetry


Spoke to:: Dr. Ferrell


Admitting Medical Physician:: Dr. Ferrell


Condition at Disposition:: Stable, Improved

## 2019-01-23 LAB
ALBUMIN SERPL-MCNC: 2.9 GM/DL (ref 4.2–5.5)
ALBUMIN/GLOB SERPL: 0.9 {RATIO} (ref 1–1.8)
ALP SERPL-CCNC: 22 U/L (ref 34–104)
ALT SERPL-CCNC: 6 U/L (ref 7–52)
ANION GAP SERPL CALC-SCNC: 12.4 MMOL/L (ref 7–16)
AST SERPL-CCNC: 10 U/L (ref 13–39)
BASOPHILS # BLD AUTO: 0.1 TH/CUMM (ref 0–0.2)
BASOPHILS NFR BLD AUTO: 0.7 % (ref 0–2)
BILIRUB SERPL-MCNC: 0.1 MG/DL (ref 0.3–1)
BUN SERPL-MCNC: 35 MG/DL (ref 7–25)
CALCIUM SERPL-MCNC: 8.8 MG/DL (ref 8.6–10.3)
CHLORIDE SERPL-SCNC: 108 MEQ/L (ref 98–107)
CO2 SERPL-SCNC: 23.3 MEQ/L (ref 21–31)
CREAT SERPL-MCNC: 1.9 MG/DL (ref 0.7–1.3)
EOSINOPHIL # BLD AUTO: 0.6 TH/CMM (ref 0.1–0.4)
EOSINOPHIL NFR BLD AUTO: 7.1 % (ref 0–5)
ERYTHROCYTE [DISTWIDTH] IN BLOOD BY AUTOMATED COUNT: 12.2 % (ref 11.5–20)
GLOBULIN SER-MCNC: 3.1 GM/DL
GLUCOSE SERPL-MCNC: 82 MG/DL (ref 70–105)
HCT VFR BLD CALC: 29.4 % (ref 41–60)
HGB BLD-MCNC: 9.7 GM/DL (ref 12–16)
LYMPHOCYTE AB SER FC-ACNC: 2 TH/CMM (ref 1.5–3)
LYMPHOCYTES NFR BLD AUTO: 23.1 % (ref 20–50)
MAGNESIUM SERPL-MCNC: 2.2 MG/DL (ref 1.9–2.7)
MCH RBC QN AUTO: 31.6 PG (ref 27–31)
MCHC RBC AUTO-ENTMCNC: 33.1 PG (ref 28–36)
MCV RBC AUTO: 95.4 FL (ref 80–99)
MONOCYTES # BLD AUTO: 0.9 TH/CMM (ref 0.3–1)
MONOCYTES NFR BLD AUTO: 10.2 % (ref 2–10)
NEUTROPHILS # BLD: 5.1 TH/CMM (ref 1.8–8)
NEUTROPHILS NFR BLD AUTO: 58.9 % (ref 40–80)
PLATELET # BLD: 285 TH/CMM (ref 150–400)
PMV BLD AUTO: 7.1 FL
POTASSIUM SERPL-SCNC: 5.7 MEQ/L (ref 3.5–5.1)
RBC # BLD AUTO: 3.09 MIL/CMM (ref 3.8–5.8)
SODIUM SERPL-SCNC: 138 MEQ/L (ref 136–145)
WBC # BLD AUTO: 8.7 TH/CMM (ref 4.8–10.8)

## 2019-01-23 RX ADMIN — DILTIAZEM HYDROCHLORIDE SCH MG: 30 TABLET, FILM COATED ORAL at 12:09

## 2019-01-23 RX ADMIN — DILTIAZEM HYDROCHLORIDE SCH MG: 30 TABLET, FILM COATED ORAL at 21:48

## 2019-01-23 RX ADMIN — DILTIAZEM HYDROCHLORIDE SCH: 30 TABLET, FILM COATED ORAL at 04:52

## 2019-01-23 RX ADMIN — POLYETHYLENE GLYCOL 3350 SCH GM: 17 POWDER, FOR SOLUTION ORAL at 08:41

## 2019-01-23 RX ADMIN — PANTOPRAZOLE SODIUM SCH MG: 40 TABLET, DELAYED RELEASE ORAL at 08:41

## 2019-01-23 NOTE — CONSULTATION
DATE OF CONSULTATION:  01/23/2019



The patient of Dr. Jian Davila.



HISTORY OF PRESENT ILLNESS:  This is a 73-year-old male patient who was brought

to the hospital with urinary tract infection.  The patient developed atrial

fibrillation with rapid ventricular response.  Hence, cardiac consult is

requested.



PAST MEDICAL HISTORY:  Paranoid schizophrenia, BPH, major depression, DVT with

pulmonary emboli, onychomycosis of the toenails, and iron deficiency anemia.



FAMILY HISTORY:  Unremarkable.



SOCIAL HISTORY:  No history of smoking, alcohol abuse.



ALLERGIES:  No known allergies.



PHYSICAL EXAMINATION:

VITAL SIGNS:  Blood pressure 130/80, pulse 150 and irregular, respirations 20.

HEAD:  Normocephalic.  No lumps or bumps.

EYES:  Pupils equal, reactive to light.  Fundi show AV nicking, sclerae white,

conjunctivae pink.

NECK:  Carotid 2+.  Normal upstroke.  JVD flat.  Thyroid not palpable.  Lymph

nodes not palpable.  Thyroid not palpable.

LUNGS:  Clear.

HEART:  Irregular ____.

ABDOMEN:  Soft.  Liver, spleen not palpable.  No organomegaly.  Bowel sounds

active.

NEUROLOGIC:  Unremarkable.

EXTREMITIES:  Peripheral pulses 1+.  The patient has onychomycosis of the

toenails.



CLINICAL IMPRESSION:

1.  Paroxysmal atrial fibrillation converted to normal sinus rhythm with

Cardizem.

2.  Hypertension.

3.  Urinary tract infection.

4.  Paranoid schizophrenia.

5.  Benign prostatic hypertrophy.

6.  Major depression.

7.  Deep venous thrombosis with pulmonary emboli.

8.  Onychomycosis of the toenails.



PLAN:  The patient to continue on Cardizem 60 mg q.8 hours, anticoagulation, and

metoprolol.  The patient to continue IV antibiotics.  The patient had

echocardiogram, which showed ejection fraction 77% with a left ventricular

hypertrophy and mild-to-moderate mitral regurgitation and mild tricuspid

regurgitation, right ventricular systolic pressure 35 mmHg.





DD: 01/23/2019 14:43

DT: 01/23/2019 22:55

JOB# [id number]  [id number]

## 2019-01-23 NOTE — CARDIOLOGY
01/23/2019



PATIENT OF:  Dr. Ferrell.



M-MODE ECHOCARDIOGRAM:  Mitral valve, anterior leaflet of mitral valve shows

normal excursion, EF velocity.  Posterior leaflet of the mitral valve shows

normal excursion.  Left ventricle posterior wall shows normal thickness,

excursion.  Interventricular septum showed normal thickness, excursion. 

Ejection fraction 77%.  Left atrium normal.  Aortic root shows normal dimension,

normal excursion of aortic leaflets.



CONCLUSION:  Normal M-mode echo, ejection fraction 77%.



2D ECHO:  Long axis view showed normal sized left ventricle with normal wall

motion, mitral valve shows normal excursion.  Left atrium normal.  Aortic root

shows normal dimension, normal excursion of aortic leaflets.  Short axis view of

mitral valve normal.  Short axis view of aortic valve normal.  Apical four

chamber view showed normal sized left ventricle, left atrium, right ventricle,

right atrium, tricuspid and mitral valve.  Ejection fraction 77%.



CONCLUSION:  Hypertrophy of the left ventricle, ejection fraction 77%.



Doppler study shows mild-to-moderate mitral regurgitation, mild tricuspid

regurgitation, right ventricular systolic pressure 35 mmHg.





DD: 01/23/2019 15:27

DT: 01/23/2019 16:48

JOB# [id number]  [id number]

## 2019-01-23 NOTE — DIAGNOSTIC IMAGING REPORT
CHEST X-RAY: AP view



INDICATION: pain



COMPARISON: 12/22/2018



FINDINGS: Increased right basal lung markings are now noted.  Chronic

changes are noted.  Mild cardiomegaly is noted.  Degenerative changes of

the spine are noted.



IMPRESSION:



Developing right basal atelectasis versus infiltrate, clinical

correlation and follow-up recommended.



Mild cardiomegaly.

## 2019-01-23 NOTE — HISTORY & PHYSICAL
ADMIT DATE:  12/23/2018



CHIEF COMPLAINT:  Nausea, vomiting and diarrhea.



HISTORY OF PRESENT ILLNESS:  The patient is of 73-year-old  gentleman

who resides at [facility] who has been admitted couple of times in

the last few months for infection including UTI and gastroenteritis, who

apparently had a 1-day history of nausea, vomiting and diarrhea, reportedly

about 8 episodes of nausea and vomiting.  The patient was transferred to the ER

where pertinent findings include potassium of 5.3, BUN and creatinine of 34/2.0

and a UA consistent with a UTI.  Overnight, the patient also converted to AFib

with a rate of 140s-150s, but now is back to normal sinus rhythm after being

given a beta blocker or after being given metoprolol.  He appears lethargic and

somewhat weak and unable to provide any history.



PAST MEDICAL HISTORY:  Severe intellectual disability, severe hydronephrosis,

history of benign prostatic hypertrophy, status post TURP, essential

hypertension, history of previous lower extremity DVT, history of

depression/psych disorder, history of left lower extremity wound infection,

history of recent cystoscopy with hyperactive urinary bladder.



PAST SURGICAL HISTORY:  No major surgeries noted or reported.



FAMILY HISTORY:  Likely noncontributory to this admission.



SOCIAL HISTORY:  No tobacco, ETOH or illicit drug usage.  He lives in a group

home.



ALLERGIES:  NIACIN.



OUTPATIENT MEDICATIONS:  Tylenol 325 q.4 p.r.n. for pain or fever, calcium 500

mg b.i.d., vitamin D3 one tab every day, Dexilant 1 cap every day, Depakote 500

mg t.i.d., docusate sodium 250 every day.  Zofran 4 mg q.6 hours p.r.n. for

nausea, vomiting.  MiraLax 1 packet every day or 17 g every day, Altace on 10 mg

b.i.d., Xarelto 20 mg every day, VESIcare 5 mg every day.



REVIEW OF SYSTEMS:  Not able to be obtained given the patient's condition,

please refer to the HPI.



PHYSICAL EXAMINATION:

VITAL SIGNS:  Temperature 97.4, pulse 69, respirations 18, BP 98/50, satting 97%

on room air.

GENERAL:  Well-nourished, developmentally delayed male, currently asleep, no

acute distress.  He is nontoxic appearing.

NECK:  There is no JVD or LAD.

CARDIOVASCULAR:  Currently, regular rate and rhythm without any murmurs.

LUNGS:  Clear to auscultation bilaterally.

ABDOMEN:  Soft, supple, nontender, nondistended, normoactive bowel sounds.

EXTREMITIES:  No edema.

NEUROLOGIC:  Full exam cannot be done, but cranial nerves 2-12 appear to be

within normal limits.



LABORATORY DATA:  On admission, white count 9.0, H and H 10/29, platelet count

of 79.  Potassium 5.3, BUN and creatinine 35/2.0.  Glucose 123, AST 10, ALT 6. 

Lipase 17.  UA showed small blood, positive nitrite, moderate leukocyte

esterase, 2-5 rbc's, 10-25 wbc's with 2+ bacteria.



DIAGNOSTICS:  Chest x-ray shows developing right basilar atelectasis versus

infiltrate, mild cardiomegaly and EKG done on admission shows sinus rhythm at a

rate of 81.



ASSESSMENT:

1.  Sepsis likely secondary to urinary tract infection and developing pneumonia.

2.  Urinary tract infection.

3.  Developing right basilar atelectasis versus infiltrate.

4.  Hyperkalemia.

5.  Acute on chronic renal insufficiency.

6.  History of hydronephrosis.

7.  History of intellectual disability.

8.  History of essential hypertension.

9.  Dyslipidemia.

10.  Chronic anemia.

11.  New onset atrial fibrillation with rapid ventricular response, now rate

controlled.



PLAN:  The patient has been admitted to [facility] where he has been placed on IV

fluids, IV antibiotics, namely Rocephin and will be kept on his other

medications as scheduled.  I have started a beta blocker and a 2D echo as well

as a Cardiology consult have also been placed.  He will receive Kayexalate for

his elevated potassium level and will continue to monitor labs on a daily basis.

 He has been pancultured and a followup x-ray will be done.  Also to follow up

on the right lower infiltrate.





DD: 01/23/2019 09:34

DT: 01/23/2019 10:55

JOB# [id number]  [id number]

## 2019-01-24 LAB
ANION GAP SERPL CALC-SCNC: 12.7 MMOL/L (ref 7–16)
BASOPHILS # BLD AUTO: 0.1 TH/CUMM (ref 0–0.2)
BASOPHILS NFR BLD AUTO: 1 % (ref 0–2)
BUN SERPL-MCNC: 32 MG/DL (ref 7–25)
CALCIUM SERPL-MCNC: 9.3 MG/DL (ref 8.6–10.3)
CHLORIDE SERPL-SCNC: 109 MEQ/L (ref 98–107)
CO2 SERPL-SCNC: 25.4 MEQ/L (ref 21–31)
CREAT SERPL-MCNC: 1.9 MG/DL (ref 0.7–1.3)
EOSINOPHIL # BLD AUTO: 0.6 TH/CMM (ref 0.1–0.4)
EOSINOPHIL NFR BLD AUTO: 7.5 % (ref 0–5)
ERYTHROCYTE [DISTWIDTH] IN BLOOD BY AUTOMATED COUNT: 12.3 % (ref 11.5–20)
GLUCOSE SERPL-MCNC: 94 MG/DL (ref 70–105)
HCT VFR BLD CALC: 28 % (ref 41–60)
HGB BLD-MCNC: 9.3 GM/DL (ref 12–16)
LYMPHOCYTE AB SER FC-ACNC: 1.9 TH/CMM (ref 1.5–3)
LYMPHOCYTES NFR BLD AUTO: 24.1 % (ref 20–50)
MAGNESIUM SERPL-MCNC: 2.1 MG/DL (ref 1.9–2.7)
MCH RBC QN AUTO: 31.7 PG (ref 27–31)
MCHC RBC AUTO-ENTMCNC: 33.4 PG (ref 28–36)
MCV RBC AUTO: 94.9 FL (ref 80–99)
MONOCYTES # BLD AUTO: 0.9 TH/CMM (ref 0.3–1)
MONOCYTES NFR BLD AUTO: 11.4 % (ref 2–10)
NEUTROPHILS # BLD: 4.5 TH/CMM (ref 1.8–8)
NEUTROPHILS NFR BLD AUTO: 56 % (ref 40–80)
PLATELET # BLD: 280 TH/CMM (ref 150–400)
PMV BLD AUTO: 7.2 FL
POTASSIUM SERPL-SCNC: 6.1 MEQ/L (ref 3.5–5.1)
RBC # BLD AUTO: 2.94 MIL/CMM (ref 3.8–5.8)
SODIUM SERPL-SCNC: 141 MEQ/L (ref 136–145)
WBC # BLD AUTO: 8 TH/CMM (ref 4.8–10.8)

## 2019-01-24 RX ADMIN — CIPROFLOXACIN SCH MLS/HR: 2 INJECTION, SOLUTION INTRAVENOUS at 21:50

## 2019-01-24 RX ADMIN — CIPROFLOXACIN SCH MLS/HR: 2 INJECTION, SOLUTION INTRAVENOUS at 12:15

## 2019-01-24 RX ADMIN — DILTIAZEM HYDROCHLORIDE SCH MG: 30 TABLET, FILM COATED ORAL at 13:49

## 2019-01-24 RX ADMIN — POLYETHYLENE GLYCOL 3350 SCH GM: 17 POWDER, FOR SOLUTION ORAL at 09:22

## 2019-01-24 RX ADMIN — ANORECTAL OINTMENT PRN APPL: 15.7; .44; 24; 20.6 OINTMENT TOPICAL at 17:43

## 2019-01-24 RX ADMIN — DILTIAZEM HYDROCHLORIDE SCH: 30 TABLET, FILM COATED ORAL at 21:52

## 2019-01-24 RX ADMIN — DILTIAZEM HYDROCHLORIDE SCH MG: 30 TABLET, FILM COATED ORAL at 06:14

## 2019-01-24 RX ADMIN — PANTOPRAZOLE SODIUM SCH MG: 40 TABLET, DELAYED RELEASE ORAL at 09:22

## 2019-01-24 NOTE — DIAGNOSTIC IMAGING REPORT
Portable chest x-ray



HISTORY: Cough



Compared with prior exam of January 22, 2019, the heart appears

enlarged.  No focal pulmonary processes.  No hilar or mediastinal

abnormalities.



IMPRESSION:

1.  No acute focal pulmonary processes

2.  Cardiomegaly

## 2019-01-24 NOTE — GENERAL PROGRESS NOTE
Subjective





- Review of Systems


Service Date: 19


Subjective: 





Patient has no complaint of chest pain or palpitation





Objective





- Results


Result Diagrams: 


 19 05:30





 19 05:30


Recent Labs: 


 Laboratory Last Values











WBC  8.0 Th/cmm (4.8-10.8)   19  05:30    


 


RBC  2.94 Mil/cmm (3.80-5.80)  L  19  05:30    


 


Hgb  9.3 gm/dL (12-16)  L  19  05:30    


 


Hct  28.0 % (41.0-60)  L  19  05:30    


 


MCV  94.9 fl (80-99)   19  05:30    


 


MCH  31.7 pg (27.0-31.0)  H  19  05:30    


 


MCHC Differential  33.4 pg (28.0-36.0)   19  05:30    


 


RDW  12.3 % (11.5-20.0)   19  05:30    


 


Plt Count  280 Th/cmm (150-400)   19  05:30    


 


MPV  7.2 fl  19  05:30    


 


Neutrophils %  56.0 % (40.0-80.0)   19  05:30    


 


Lymphocytes %  24.1 % (20.0-50.0)   19  05:30    


 


Monocytes %  11.4 % (2.0-10.0)  H  19  05:30    


 


Eosinophils %  7.5 % (0.0-5.0)  H  19  05:30    


 


Basophils %  1.0 % (0.0-2.0)   19  05:30    


 


PT  11.4 SECONDS (9.5-11.5)   19  14:59    


 


INR  1.11  (0.5-1.4)   19  14:59    


 


PTT (Actin FS)  27.9 SECONDS (26.0-38.0)   19  14:59    


 


Sodium  141 mEq/L (136-145)   19  05:30    


 


Potassium  6.1 mEq/L (3.5-5.1)  H*  19  05:30    


 


Chloride  109 mEq/L ()  H  19  05:30    


 


Carbon Dioxide  25.4 mEq/L (21.0-31.0)   19  05:30    


 


Anion Gap  12.7  (7.0-16.0)   19  05:30    


 


BUN  32 mg/dL (7-25)  H  19  05:30    


 


Creatinine  1.9 mg/dL (0.7-1.3)  H  19  05:30    


 


Est GFR ( Amer)  TNP   19  05:30    


 


Est GFR (Non-Af Amer)  TNP   19  05:30    


 


BUN/Creatinine Ratio  16.8   19  05:30    


 


Glucose  94 mg/dL ()   19  05:30    


 


Whole Bld Lactic Acid  1.91 mmol/L (0.60-1.99)   19  05:39    


 


Calcium  9.3 mg/dL (8.6-10.3)   19  05:30    


 


Magnesium  2.1 mg/dL (1.9-2.7)   19  05:30    


 


Total Bilirubin  0.1 mg/dL (0.3-1.0)  L  19  05:39    


 


AST  10 U/L (13-39)  L  19  05:39    


 


ALT  6 U/L (7-52)  L  19  05:39    


 


Alkaline Phosphatase  22 U/L ()  L  19  05:39    


 


Creatine Kinase  31 U/L ()   19  14:59    


 


Troponin I  < 0.01 ng/mL (0.01-0.05)  L  19  14:59    


 


Total Protein  6.0 gm/dL (6.0-8.3)   19  05:39    


 


Albumin  2.9 gm/dL (4.2-5.5)  L  19  05:39    


 


Globulin  3.1 gm/dL  19  05:39    


 


Albumin/Globulin Ratio  0.9  (1.0-1.8)  L  19  05:39    


 


Amylase  29 U/L ()   19  16:27    


 


Lipase  17 U/L (11-82)   19  16:27    


 


Urine Source  MIDSTREAM   19  15:00    


 


Urine Color  STRAW   19  15:00    


 


Urine Clarity  HAZY  (CLEAR)   19  15:00    


 


Urine pH  7.0  (4.6 - 8.0)   19  15:00    


 


Ur Specific Gravity  1.010  (1.005-1.030)   19  15:00    


 


Urine Protein  TRACE mg/dL (NEGATIVE)   19  15:00    


 


Urine Glucose (UA)  NEGATIVE mg/dL (NEGATIVE)   19  15:00    


 


Urine Ketones  NEGATIVE mg/dL (NEGATIVE)   19  15:00    


 


Urine Blood  SMALL  (NEGATIVE)  H  19  15:00    


 


Urine Nitrate  POSITIVE  (NEGATIVE)  H  19  15:00    


 


Urine Bilirubin  NEGATIVE  (NEGATIVE)   19  15:00    


 


Urine Urobilinogen  0.2 E.U./dL (0.2 - 1.0)   19  15:00    


 


Ur Leukocyte Esterase  MODERATE  (NEGATIVE)  H  19  15:00    


 


Urine RBC  2-5 /hpf (0-5)  H  19  15:00    


 


Urine WBC  10-25 /hpf (0-5)  H  19  15:00    


 


Ur Epithelial Cells  FEW /lpf (FEW)   19  15:00    


 


Urine Bacteria  2+ /hpf (NONE SEEN)  H  19  15:00    














- Physical Exam


Vitals and I&O: 


 Vital Signs











Temp  97.8 F   19 08:00


 


Pulse  70   19 09:24


 


Resp  18   19 09:13


 


BP  109/55   19 09:24


 


Pulse Ox  97   19 09:13








 Intake & Output











 19





 18:59 06:59 18:59


 


Intake Total 975 1025 


 


Balance 975 1025 


 


Weight (lbs)  65.771 kg 


 


Intake:   


 


  Intake, IV Amount 975 925 


 


    Sodium Chloride 0.9% 1, 925 925 





    000 ml @ 100 mls/hr IV .   





    Q10H [facility] Rx#:[id number]   


 


    cefTRIAXone 1 gm In 50  





    Sodium Chloride 0.9% 50   





    ml @ 100 mls/hr IV Q24HR   





    [facility] Rx#:[id number]   


 


  Oral  100 


 


Other:   


 


  # Voids  4 


 


  # Bowel Movements  0 


 


  Stool Characteristics Liquid Liquid 


 


  Weight Source  Bedscale 











Active Medications: 


Current Medications





Acetaminophen (Tylenol)  325 mg PO Q4HR PRN


   PRN Reason: Pain (Mild)


   Stop: 19 22:18


Calcium Carbonate (Os-Olivier)  500 mg PO BID [facility]


   Stop: 19 08:59


   Last Admin: 19 09:22 Dose:  500 mg


Cholecalciferol (Vitamin D3)  1,000 iu PO DAILY [facility]


   Stop: 19 08:59


   Last Admin: 19 09:21 Dose:  1,000 iu


Diltiazem HCl (Cardizem)  20 mg IVP Q4H PRN


   PRN Reason: HR Greater than 120 per min


   Stop: 19 22:41


Diltiazem HCl (Cardizem)  60 mg PO Q8HR [facility]


   Stop: 19 04:59


   Last Admin: 19 06:14 Dose:  60 mg


Divalproex Sodium (Depakote Dr)  500 mg PO TID [facility]; Protocol


   Stop: 19 22:29


   Last Admin: 19 09:21 Dose:  500 mg


Docusate Sodium (Colace)  250 mg PO DAILY [facility]


   Stop: 19 08:59


   Last Admin: 19 09:22 Dose:  250 mg


Ceftriaxone Sodium 1 gm/ (Sodium Chloride)  50 mls @ 100 mls/hr IV Q24HR [facility]


   Stop: 19 08:59


   Last Admin: 19 09:38 Dose:  100 mls/hr


Sodium Chloride (Nacl 0.9%)  1,000 mls @ 100 mls/hr IV .Q10H [facility]


   Stop: 19 17:29


   Last Admin: 19 21:49 Dose:  100 mls/hr


Metoprolol Tartrate (Lopressor)  25 mg PO BID [facility]


   Stop: 19 21:59


   Last Admin: 19 09:24 Dose:  Not Given


Miscellaneous (Solifenacin Succinate [Vesicare])  1 tab PO DAILY [facility]


   Stop: 19 08:59


Miscellaneous (Clinical Monitoring)  1 ea [facility] DAILY PRN


   PRN Reason: RENAL DOSING- XARELTO


   Stop: 19 15:17


Ondansetron HCl (Zofran Odt)  4 mg PO Q6H PRN


   PRN Reason: NAUSEA/VOMITING


   Stop: 19 09:04


Pantoprazole Sodium (Protonix)  40 mg PO DAILY [facility]


   Stop: 19 08:59


   Last Admin: 19 09:22 Dose:  40 mg


Polyethylene Glycol (Miralax)  17 gm PO DAILY [facility]


   Stop: 19 08:59


   Last Admin: 19 09:22 Dose:  17 gm


Rivaroxaban (Xarelto)  20 mg PO QPM [facility]


   Stop: 19 16:59


   Last Admin: 19 17:23 Dose:  20 mg








General: Mild distress


HEENT: Other (normal)


Neck: Supple, JVD (normal), +2 carotid pulse wo bruit


Cardiovascular: Regular rate, Systolic murmurs, Other (atrial fibrillation)


Lungs: Other (basilar rales and rhonchi)


Abdomen: Soft, Other (no organomegaly)


Extremities: Edema


Neurological: Reflexes 2+





- Procedures


Procedures: 


 Procedures











Procedure Code Date


 


C & S-INTEGUMENT 91.63 95


 


CULTURE OTHR SPECIMN AEROBIC 98515 95


 


DILATION OF URETHRA, ENDO [id number] 18


 


DX ULTRASOUND-VASCULAR 88.77 95


 


ELECTROCARDIOGRAPH MONIT 89.54 95


 


INJECT ANTIBIOTIC 99.21 10/09/95


 


INJECT ANTICOAGULANT 99.19 95


 


MICROBE SUSCEPTIBLE DISK 35927 95


 


OTHER C.A.T. SCAN 88.38 95


 


URINARY SYSTEM X-RAY NEC 87.79 95


 


WHIRLPOOL THERAPY 62769 95


 


WHIRLPOOL TREATMENT 93.32 95














Assessment/Plan





- Assessment


Assessment: 





Atrial fibrillation converted to normal sinus rhythm


Hypertension


Urinary tract infection


Paranoid schizophrenia


BPH


Major depression


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


Onychomycosis of toenails





- Plan


Plan: 





Continue present management discontinue to control atrial fibrillation continue 

antibiotics





Nutritional Asmnt/Malnutr-PDOC





- Dietary Evaluation


Malnutrition Findings (Please click <Entered> for more info): 








Nutritional Asmnt/Malnutrition                             Start:  19 13:

32


Text:                                                      Status: Complete    

  


Freq:                                                                          

  


Protocol:                                                                      

  


 Document     19 13:32  [id number]  (Rec: 19 13:52  [email or web address]  [email or web address])


 Nutritional Asmnt/Malnutrition


     Patient General Information


      Nutritional Screening                      High Risk


      Diagnosis                                  UTI, sepsis


      Pertinent Medical Hx/Surgical Hx           HTN, DVT/PE, dyslipidemia,


                                                 seizures, dementia


      Subjective Information                     Pt was resting in bed at time


                                                 of visit, confused. PO intake


                                                 is 75% breakfast today per CNA


                                                 . Per nurse note, MD ordered


                                                 kayexalate for elevated


                                                 potassium levels.


      Current Diet Order/ Nutrition Support      pureed, no added salt 4gm


      Pertinent Medications                      oscal, vit d3, cardizem,


                                                 colace, zofran, protonix,


                                                 miralax, NaCl 0.9%


      Pertinent Labs                              K 5.7, cl 108, BUN 35, cr


                                                 1.9, alb 2.9


     Nutritional Hx/Data


      Height                                     1.68 m


      Height (Calculated Centimeters)            167.6


      Current Weight (lbs)                       65.771 kg


      Weight (Calculated Kilograms)              65.8


      Weight (Calculated Grams)                  88602.9


      Ideal Body Weight                          142


      Body Mass Index (BMI)                      23.3


      Weight Status                              Approriate


     GI Symptoms


      GI Symptoms                                Vomitting


      Last BM                                    


      Difficult in:                              None


      Food Allergies                             No


      Skin Integrity/Comment:                    bilateral lower leg


                                                 discoloration and open wound,


                                                 refugio 13


      Current %PO                                Good (%)


     Estimated Nutritional Goals


      BEE in Kcals:                              Using Current wt


      Calories/Kcals/Kg                          25-30


      Kcals Calculated                           


      Protein:                                   Using Current wt


      Protein g/k-1.2


      Protein Calculated                         65-79


      Fluid: ml                                   (1ml/kcal)


     Nutritional Problem


      1. Problem


       Problem                                   altered nutrition related labs


       Etiology                                  fluid/electrolyte imbalance,


                                                 possible renal dysfunction


       Signs/Symptoms:                           K 5.7, cl 108, BUN 35, cr 1.9


     Malnutrition Alert


      Is there a minimum of two criteria         No


       selected?                                 


       Query Text:Check all the applicable       


       criteria. A minimum of two criteria are   


       recommended for diagnosis of either       


       severe or non-severe malnutrition.        


     Malnutrition Related to Morbid Obesity


      Malnutrition related to morbid obesity     No


     Intervention/Recommendation


      Comments                                   1. Continue with pureed, no


                                                 added salt 4gm diet as ordered


                                                 . Recommend to limit high


                                                 potassium food d/t elevated


                                                 potassium lab


                                                 2. Add Manuel BID for wound


                                                 healing


                                                 3. MD to adjust electrolytes


                                                 4. Monitor PO intake, wt, labs


                                                 and skin integrity


                                                 5. F/U as high risk in 2-3


                                                 days


     Expected Outcomes/Goals


      Expected Outcomes/Goals                    1. PO intake to meet at least


                                                 75% of nutritional needs.


                                                 2. Wt stability, improved skin


                                                 integrity, labs to approach


                                                 WNL.


                                                 Reviewed by Galina Cruz RD

## 2019-01-25 LAB
ANION GAP SERPL CALC-SCNC: 12.2 MMOL/L (ref 7–16)
BASOPHILS # BLD AUTO: 0.1 TH/CUMM (ref 0–0.2)
BASOPHILS NFR BLD AUTO: 1.1 % (ref 0–2)
BUN SERPL-MCNC: 26 MG/DL (ref 7–25)
CALCIUM SERPL-MCNC: 8.9 MG/DL (ref 8.6–10.3)
CHLORIDE SERPL-SCNC: 108 MEQ/L (ref 98–107)
CHOLEST SERPL-MCNC: 108 MG/DL (ref ?–200)
CO2 SERPL-SCNC: 23.2 MEQ/L (ref 21–31)
CREAT SERPL-MCNC: 1.6 MG/DL (ref 0.7–1.3)
EOSINOPHIL # BLD AUTO: 0.4 TH/CMM (ref 0.1–0.4)
EOSINOPHIL NFR BLD AUTO: 5.6 % (ref 0–5)
ERYTHROCYTE [DISTWIDTH] IN BLOOD BY AUTOMATED COUNT: 12 % (ref 11.5–20)
GLUCOSE SERPL-MCNC: 83 MG/DL (ref 70–105)
HCT VFR BLD CALC: 27.1 % (ref 41–60)
HDLC SERPL-MCNC: 26 MG/DL (ref 23–92)
HGB BLD-MCNC: 9.1 GM/DL (ref 12–16)
LYMPHOCYTE AB SER FC-ACNC: 1.4 TH/CMM (ref 1.5–3)
LYMPHOCYTES NFR BLD AUTO: 18.8 % (ref 20–50)
MAGNESIUM SERPL-MCNC: 1.8 MG/DL (ref 1.9–2.7)
MCH RBC QN AUTO: 31.9 PG (ref 27–31)
MCHC RBC AUTO-ENTMCNC: 33.7 PG (ref 28–36)
MCV RBC AUTO: 94.8 FL (ref 80–99)
MONOCYTES # BLD AUTO: 0.8 TH/CMM (ref 0.3–1)
MONOCYTES NFR BLD AUTO: 10.5 % (ref 2–10)
NEUTROPHILS # BLD: 4.9 TH/CMM (ref 1.8–8)
NEUTROPHILS NFR BLD AUTO: 64 % (ref 40–80)
PLATELET # BLD: 302 TH/CMM (ref 150–400)
PMV BLD AUTO: 6.8 FL
POTASSIUM SERPL-SCNC: 4.4 MEQ/L (ref 3.5–5.1)
RBC # BLD AUTO: 2.86 MIL/CMM (ref 3.8–5.8)
SODIUM SERPL-SCNC: 139 MEQ/L (ref 136–145)
TRIGL SERPL-MCNC: 167 MG/DL (ref ?–150)
WBC # BLD AUTO: 7.6 TH/CMM (ref 4.8–10.8)

## 2019-01-25 RX ADMIN — DILTIAZEM HYDROCHLORIDE SCH MG: 30 TABLET, FILM COATED ORAL at 12:51

## 2019-01-25 RX ADMIN — POLYETHYLENE GLYCOL 3350 SCH GM: 17 POWDER, FOR SOLUTION ORAL at 08:41

## 2019-01-25 RX ADMIN — ANORECTAL OINTMENT PRN APPL: 15.7; .44; 24; 20.6 OINTMENT TOPICAL at 06:23

## 2019-01-25 RX ADMIN — DILTIAZEM HYDROCHLORIDE SCH: 30 TABLET, FILM COATED ORAL at 05:26

## 2019-01-25 RX ADMIN — PANTOPRAZOLE SODIUM SCH MG: 40 TABLET, DELAYED RELEASE ORAL at 08:41

## 2019-01-25 RX ADMIN — CIPROFLOXACIN SCH MLS/HR: 2 INJECTION, SOLUTION INTRAVENOUS at 10:16

## 2019-01-25 NOTE — DISCHARGE SUMMARY
DATE OF DISCHARGE:     01/25/2019



ADMITTING DIAGNOSES:

1.  Sepsis.

2.  Urinary tract infection.

3.  Acute on chronic renal insufficiency.

4.  Dehydration.

5.  Hyperkalemia.



SECONDARY DIAGNOSES:  Include;

1.  Chronic renal insufficiency secondary to hyperactive bladder.

2.  History of bilateral hydronephrosis.

3.  Depression.

4.  Psychiatric disorder.

5.  Essential hypertension.

6.  History of lower extremity deep venous thrombosis.

7.  Intellectual disability.

8.  Chronic anemia.

9.  Dyslipidemia.

10.  History of atrial fibrillation.



DISCHARGE DIAGNOSES:

1.  Sepsis secondary to urinary tract infection -- clinically stable.

2.  Complex urinary tract infection -- awaiting final C/S results.

3.  Bacteremia with gram-positive rods.

4.  Atrial fibrillation with rapid ventricular response, now rate controlled.



CONSULTANTS:  Cardiology, Dr. Diogenes Ferguson.



DIAGNOSTICS:  Major diagnostics underwent a 2D echo on 23rd showing hypertrophy

of the left ventricle with an EF of 77%.



BRIEF HOSPITAL COURSE:  This is a 73-year-old  male, who resides at

[facility], who has been admitted a couple of times in the last

few weeks for UTI, gastroenteritis, who presented with a 1-day history of

nausea, vomiting, diarrhea and fevers.  He was seen at the ED where pertinent

findings included a potassium of 5.3 and a UA consistent with a UTI.  By

hospital day #1, he was noted to be tachycardic with a rhythm consistent with

AFib with RVR.  He was initially placed on IV fluids, IV antibiotics and

eventually on Lopressor given the RVR as well as diltiazem.  Cardiology consult

was also placed for further management and care.  His labs remained relatively

stable except for a potassium level of 6.1 on 01/24/2018, which improved to a

level of 4.4 on 01/25/2019, which was treated with Kayexalate.  The white count

did remain normal overall and his cultures became available by a 22nd.  The

urine culture showing coagulase negative Staph and the blood showed

gram-positive rods.  Given the results, the patient has remained on antibiotics,

namely Rocephin and ciprofloxacin for broad coverage.



MEDICATIONS ON DISCHARGE:  Cipro 200 q.12 hours, Rocephin 1 gram q.24 hours,

vitamin D 1000 International units every day, Tylenol 325 q.4 p.r.n. for pain,

calcium carbonate 500 mg b.i.d., diltiazem 60 mg q.8 hours, Depakote 500 mg

t.i.d., docusate sodium 250 every day, IV or NS up to 100 mL per hour, Protonix

40 mg every day, MiraLax 17 grams daily, Xarelto 20 mg q.p.m. and VESIcare 1 tab

daily.



CONDITION ON DISCHARGE:  Stable.



DISPOSITION:  The patient was transferred to [facility] for completion of

IV antibiotics and further management.





DD: 01/25/2019 09:28

DT: 01/25/2019 16:34

JOB# [id number]  [id number]

MTDD